# Patient Record
Sex: FEMALE | Race: WHITE | NOT HISPANIC OR LATINO | Employment: FULL TIME | URBAN - METROPOLITAN AREA
[De-identification: names, ages, dates, MRNs, and addresses within clinical notes are randomized per-mention and may not be internally consistent; named-entity substitution may affect disease eponyms.]

---

## 2020-01-01 ENCOUNTER — OFFICE VISIT (OUTPATIENT)
Dept: URGENT CARE | Facility: CLINIC | Age: 30
End: 2020-01-01
Payer: COMMERCIAL

## 2020-01-01 VITALS
OXYGEN SATURATION: 100 % | HEIGHT: 64 IN | TEMPERATURE: 97 F | BODY MASS INDEX: 25.78 KG/M2 | SYSTOLIC BLOOD PRESSURE: 115 MMHG | WEIGHT: 151 LBS | HEART RATE: 71 BPM | RESPIRATION RATE: 18 BRPM | DIASTOLIC BLOOD PRESSURE: 78 MMHG

## 2020-01-01 DIAGNOSIS — S62.91XA HAND FRACTURE, RIGHT, CLOSED, INITIAL ENCOUNTER: Primary | ICD-10-CM

## 2020-01-01 PROCEDURE — 99203 OFFICE O/P NEW LOW 30 MIN: CPT | Mod: S$GLB,,, | Performed by: EMERGENCY MEDICINE

## 2020-01-01 PROCEDURE — 73130 X-RAY EXAM OF HAND: CPT | Mod: FY,RT,S$GLB, | Performed by: RADIOLOGY

## 2020-01-01 PROCEDURE — 73130 XR HAND COMPLETE 3 VIEW RIGHT: ICD-10-PCS | Mod: FY,RT,S$GLB, | Performed by: RADIOLOGY

## 2020-01-01 PROCEDURE — 99203 PR OFFICE/OUTPT VISIT, NEW, LEVL III, 30-44 MIN: ICD-10-PCS | Mod: S$GLB,,, | Performed by: EMERGENCY MEDICINE

## 2020-01-01 RX ORDER — IBUPROFEN 200 MG
400 TABLET ORAL EVERY 6 HOURS PRN
COMMUNITY
End: 2022-05-18

## 2020-01-01 NOTE — PROGRESS NOTES
"Subjective:       Patient ID: Sherrie Bravo is a 29 y.o. female.    Vitals:  height is 5' 4" (1.626 m) and weight is 68.5 kg (151 lb). Her tympanic temperature is 97.3 °F (36.3 °C). Her blood pressure is 115/78 and her pulse is 71. Her respiration is 18 and oxygen saturation is 100%.     Chief Complaint: Hand Injury    Patient present with right hand injury patient states she ran into a door 2-3 d ago, right handed, is not pregnant, hx of right hand fx. Patient has tried taking Tylenol.     Hand Injury    Her dominant hand is their right hand. The incident occurred 2 days ago. The incident occurred at home. The injury mechanism was a direct blow. The pain is present in the right hand. The quality of the pain is described as aching and cramping. The pain is at a severity of 6/10. Associated symptoms include tingling. Pertinent negatives include no chest pain. She has tried acetaminophen, ice and elevation for the symptoms.       Constitution: Negative for chills, fatigue and fever.   HENT: Negative for congestion and sore throat.    Neck: Negative for painful lymph nodes.   Cardiovascular: Negative for chest pain and leg swelling.   Eyes: Negative for double vision and blurred vision.   Respiratory: Negative for cough and shortness of breath.    Gastrointestinal: Negative for nausea, vomiting and diarrhea.   Genitourinary: Negative for dysuria, frequency, urgency and history of kidney stones.   Musculoskeletal: Positive for pain, joint pain and joint swelling. Negative for muscle cramps and muscle ache.   Skin: Positive for color change and bruising. Negative for pale and rash.   Allergic/Immunologic: Negative for seasonal allergies.   Neurological: Negative for dizziness, history of vertigo, light-headedness, passing out and headaches.   Hematologic/Lymphatic: Negative for swollen lymph nodes.   Psychiatric/Behavioral: Negative for nervous/anxious, sleep disturbance and depression. The patient is not " nervous/anxious.        Objective:      Physical Exam   Constitutional: She is oriented to person, place, and time. She appears well-developed and well-nourished.   HENT:   Head: Normocephalic and atraumatic.   Neck: Normal range of motion. Neck supple.   Cardiovascular: Normal rate, regular rhythm, normal heart sounds and intact distal pulses.   Pulmonary/Chest: Breath sounds normal.   Musculoskeletal:   Right hand ecchymosis and tenderness 4-5 MC area, decrease finger ROM due to discomfort, remainder right hand/fingers/wrist/forearm non tender, FROM   Neurological: She is alert and oriented to person, place, and time.   Skin: Skin is warm and dry. Capillary refill takes less than 2 seconds.   Psychiatric: She has a normal mood and affect. Her behavior is normal.         Assessment:       1. Hand fracture, right, closed, initial encounter        Plan:         Hand fracture, right, closed, initial encounter  -     X-Ray Hand 3 view Right; Future; Expected date: 01/01/2020         Jermain Cazares MD  Go to the Emergency Department for any problems  Call your PCP for follow up next available.  Right fifth MC/finger long metal splint applied by JARON NICHOLSON after application.

## 2020-01-01 NOTE — PATIENT INSTRUCTIONS
Jermain Cazares MD  Go to the Emergency Department for any problems  Call your PCP for follow up next available.    Closed Hand Fracture (Adult)  You have a fracture, or broken bone, in your hand. This may be a small crack or chip in the bone. Or it may be a major break with the broken parts pushed out of place. A closed fracture means that the broken bone has not gone through the skin. A hand fracture is treated with a splint or cast. It usually takes 4 to 6 weeks to heal. Severe injuries may require surgery.     Home care  · Keep your arm elevated to reduce pain and swelling. When sitting or lying down, elevate your arm above the level of your heart. You can do this by placing your arm on a pillow that rests on your chest or on a pillow at your side. This is most important during the first 48 hours after injury.  · Apply an ice pack over the injured area for no more than 15 to 20 minutes. Do this every 1 to 2 hours for the first 24 to 48 hours. Continue with ice packs as needed to ease pain and swelling. To make an ice pack, put ice cubes in a plastic bag that seals at the top. Wrap the bag in a clean, thin towel or cloth. Never put ice or an ice pack directly on the skin. You can place the ice pack inside the sling and directly over the cast or splint. As the ice melts, be careful that the cast or splint doesnt get wet.  · Keep the cast or splint completely dry at all times. Bathe with your cast or splint out of the water, protected with 2 large plastic bags. Place 1 bag outside the other. Tape each bag with duct tape at the top end. If a fiberglass cast or splint gets wet, dry it with a hair dryer on a cool setting.  · You may use over-the-counter pain medicine to control pain, unless another pain medicine was prescribed. If you have chronic liver or kidney disease or ever had a stomach ulcer or GI bleeding, talk with your provider beforeusing these medicines.  Follow-up care  Follow up with your healthcare  provider within 1 week, or as advised. This is to be sure the bone is healing properly. If you were given a splint, it may be changed to a cast at your follow-up visit.  If X-rays were taken, you will be told of any new findings that may affect your care.  When to seek medical advice  Call your healthcare provider right away if any of these occur:  · The plaster cast or splint becomes wet or soft  · The fiberglass cast or splint stays wet for more than 24 hours  · The cast has a bad smell  · The plaster cast or splint becomes loose  · There is increased tightness or pain under the cast or splint  · The fingers on your injured hand become swollen, cold, blue, numb, or tingly  Date Last Reviewed: 12/3/2015  © 0953-6604 [employer] [address] [place] [address], [place] [address]. All rights reserved. This information is not intended as a substitute for professional medical care. Always follow your healthcare professional's instructions.      Follow up with Ortho next available.

## 2020-04-21 DIAGNOSIS — Z01.84 ANTIBODY RESPONSE EXAMINATION: ICD-10-CM

## 2020-05-21 DIAGNOSIS — Z01.84 ANTIBODY RESPONSE EXAMINATION: ICD-10-CM

## 2020-06-20 DIAGNOSIS — Z01.84 ANTIBODY RESPONSE EXAMINATION: ICD-10-CM

## 2020-07-14 ENCOUNTER — OFFICE VISIT (OUTPATIENT)
Dept: INTERNAL MEDICINE | Facility: CLINIC | Age: 30
End: 2020-07-14
Payer: COMMERCIAL

## 2020-07-14 ENCOUNTER — TELEPHONE (OUTPATIENT)
Dept: DERMATOLOGY | Facility: CLINIC | Age: 30
End: 2020-07-14

## 2020-07-14 ENCOUNTER — TELEPHONE (OUTPATIENT)
Dept: INTERNAL MEDICINE | Facility: CLINIC | Age: 30
End: 2020-07-14

## 2020-07-14 VITALS
DIASTOLIC BLOOD PRESSURE: 76 MMHG | HEART RATE: 88 BPM | SYSTOLIC BLOOD PRESSURE: 110 MMHG | WEIGHT: 140.63 LBS | OXYGEN SATURATION: 97 % | BODY MASS INDEX: 24.92 KG/M2 | HEIGHT: 63 IN

## 2020-07-14 DIAGNOSIS — Z91.09 ALLERGY TO ANIMALS: ICD-10-CM

## 2020-07-14 DIAGNOSIS — Z00.00 ANNUAL PHYSICAL EXAM: Primary | ICD-10-CM

## 2020-07-14 DIAGNOSIS — Z80.8 FAMILY HISTORY OF MELANOMA: ICD-10-CM

## 2020-07-14 PROCEDURE — 3008F BODY MASS INDEX DOCD: CPT | Mod: CPTII,S$GLB,, | Performed by: INTERNAL MEDICINE

## 2020-07-14 PROCEDURE — 99204 PR OFFICE/OUTPT VISIT, NEW, LEVL IV, 45-59 MIN: ICD-10-PCS | Mod: S$GLB,,, | Performed by: INTERNAL MEDICINE

## 2020-07-14 PROCEDURE — 99999 PR PBB SHADOW E&M-EST. PATIENT-LVL IV: ICD-10-PCS | Mod: PBBFAC,,, | Performed by: INTERNAL MEDICINE

## 2020-07-14 PROCEDURE — 99204 OFFICE O/P NEW MOD 45 MIN: CPT | Mod: S$GLB,,, | Performed by: INTERNAL MEDICINE

## 2020-07-14 PROCEDURE — 99999 PR PBB SHADOW E&M-EST. PATIENT-LVL IV: CPT | Mod: PBBFAC,,, | Performed by: INTERNAL MEDICINE

## 2020-07-14 PROCEDURE — 3008F PR BODY MASS INDEX (BMI) DOCUMENTED: ICD-10-PCS | Mod: CPTII,S$GLB,, | Performed by: INTERNAL MEDICINE

## 2020-07-14 RX ORDER — CETIRIZINE HYDROCHLORIDE 5 MG/1
5 TABLET ORAL DAILY PRN
Refills: 0 | COMMUNITY
Start: 2020-07-14 | End: 2021-01-15

## 2020-07-14 NOTE — TELEPHONE ENCOUNTER
----- Message from Madi Jansen sent at 7/14/2020 12:31 PM CDT -----  Regarding: appointment access  Patient called in to speak with someone at the office regarding scheduling an appointment. She would like a call back from the office and can be reached at    [phone]

## 2020-07-14 NOTE — PROGRESS NOTES
INTERNAL MEDICINE CLINIC    Progress Note    PRESENTING HISTORY     Previous PCP: Primary Doctor None  Chief Complaint/Reason for Visit:   No chief complaint on file.    History of Present Illness & ROS : Ms. Sherrie Chiang is a 30 y.o. female.      Last physical in [place] 6-7 years ago.    Last year she switched her shampoo.  She has sores all over the her heads.    Review of Systems:  Answers for HPI/ROS submitted by the patient on 7/13/2020   Rash  Chronicity: chronic  Onset: more than 1 month ago  Progression since onset: gradually worsening  Affected locations: scalp, neck, torso, back, right shoulder  Characteristics: redness, bruising, itchiness, peeling, scaling  Exposed to: nothing  anorexia: No  congestion: No  cough: No  diarrhea: No  eye pain: No  facial edema: No  fatigue: No  fever: No  joint pain: No  nail changes: No  rhinorrhea: No  shortness of breath: No  sore throat: No  vomiting: No  Treatments tried: nothing  Improvement on treatment: no relief  asthma: Yes  allergies: Yes  eczema: No  varicella: Yes      PAST HISTORY:     Past Medical History:   Diagnosis Date    Allergy to animals 7/14/2020    Family history of melanoma 7/14/2020    Malaria 2010    Was only on prophylatic malarial drug.       History reviewed. No pertinent surgical history.    Family History   Problem Relation Age of Onset    Melanoma Mother 25        X 4    Diabetes Maternal Grandmother        Social History     Socioeconomic History    Marital status: Single     Spouse name: Not on file    Number of children: Not on file    Years of education: Not on file    Highest education level: Not on file   Occupational History    Not on file   Social Needs    Financial resource strain: Not very hard    Food insecurity     Worry: Never true     Inability: Never true    Transportation needs     Medical: No     Non-medical: No   Tobacco Use    Smoking status: Current Some Day Smoker     Packs/day: 0.10     Years:  1.00     Pack years: 0.10    Smokeless tobacco: Never Used   Substance and Sexual Activity    Alcohol use: Not Currently    Drug use: Never    Sexual activity: Not Currently   Lifestyle    Physical activity     Days per week: 3 days     Minutes per session: 20 min    Stress: Very much   Relationships    Social connections     Talks on phone: More than three times a week     Gets together: More than three times a week     Attends Holiness service: Not on file     Active member of club or organization: No     Attends meetings of clubs or organizations: Never     Relationship status: Never    Other Topics Concern    Not on file   Social History Narrative    Works at [employer]     Single    No kids       MEDICATIONS & ALLERGIES:     No current outpatient medications on file prior to visit.     No current facility-administered medications on file prior to visit.         Review of patient's allergies indicates:   Allergen Reactions    Ricola [menthol] Hives     Ricola cough drops       Medications Reconciliation:   I have reconciled the patient's home medications with the patient/family. I have updated all changes.  Refer to After-Visit Medication List.    OBJECTIVE:     Vital Signs:  Vitals:    07/14/20 0812   BP: 110/76   Pulse: 88     Wt Readings from Last 1 Encounters:   07/14/20 0812 63.8 kg (140 lb 10.5 oz)     Body mass index is 24.92 kg/m².     Physical Exam:  General: Well developed, well nourished. No distress.  HEENT: Head is normocephalic, atraumatic  Eyes: Clear conjunctiva.  Neck: Supple, symmetrical neck; trachea midline.  Lungs: Clear to auscultation bilaterally and normal respiratory effort.  Cardiovascular: Heart with regular rate and rhythm.    Extremities: No LE edema. Pulses 2+ and symmetric.   Abdomen: Abdomen is soft, non-tender non-distended with normal bowel sounds.  Skin: Nevi noted on back.  There are some red papules on scalp.  Musculoskeletal: Normal gait.    Neurologic: Normal strength and tone. No focal numbness or weakness.   Psychiatric: Normal affect. Alert.      ASSESSMENT & PLAN:   New patient who has not seen Primary Care Provider at [facility] for the past 3 years.  Patient is new to me. Medical, surgical, social, medication and allergy histories were obtained during this visit.    Annual physical exam  -     CBC auto differential; Future; Expected date: 07/14/2020  -     Comprehensive metabolic panel; Future; Expected date: 07/14/2020  -     Lipid Panel; Future; Expected date: 07/14/2020  -     HIV 1/2 Ag/Ab (4th Gen); Future; Expected date: 07/14/2020  -     Hemoglobin A1C; Future; Expected date: 07/14/2020  -     TSH; Future; Expected date: 07/14/2020  -     Vitamin D; Future; Expected date: 01/10/2021  -     Cancel: Ambulatory referral/consult to Allergy; Future; Expected date: 07/21/2020  -     RPR; Future; Expected date: 07/14/2020    Allergy to animals  -     Ambulatory referral/consult to Allergy; Future; Expected date: 07/21/2020    Family history of melanoma  -     Ambulatory referral/consult to Dermatology; Future; Expected date: 07/21/2020      Scheduled Follow-up :  Future Appointments   Date Time Provider Department Center   1/15/2021  9:30 AM Cuca Nash MD [facility]       After Visit Medication List :     Medication List          Accurate as of July 14, 2020  8:45 AM. If you have any questions, ask your nurse or doctor.            START taking these medications    cetirizine 5 MG tablet  Commonly known as: ZYRTEC  Take 1 tablet (5 mg total) by mouth daily as needed for Allergies.  Started by: Laci Zhong MD           Where to Get Your Medications      You can get these medications from any pharmacy    You don't need a prescription for these medications  · cetirizine 5 MG tablet         Signing Physician:  Laci Zhong MD

## 2020-07-14 NOTE — TELEPHONE ENCOUNTER
----- Message from Nancy Caban sent at 7/14/2020  2:19 PM CDT -----  Hello,    This patient left the building before calling her. I am going to cancel her labs. Please put her order in to be rescheduled.    Thank you,  [facility]  [phone]

## 2020-07-20 DIAGNOSIS — Z01.84 ANTIBODY RESPONSE EXAMINATION: ICD-10-CM

## 2020-08-04 ENCOUNTER — OFFICE VISIT (OUTPATIENT)
Dept: ALLERGY | Facility: CLINIC | Age: 30
End: 2020-08-04
Payer: COMMERCIAL

## 2020-08-04 VITALS — HEIGHT: 63 IN | WEIGHT: 142.19 LBS | BODY MASS INDEX: 25.2 KG/M2 | RESPIRATION RATE: 16 BRPM

## 2020-08-04 DIAGNOSIS — J45.20 MILD INTERMITTENT ASTHMA WITHOUT COMPLICATION: ICD-10-CM

## 2020-08-04 DIAGNOSIS — J31.0 CHRONIC RHINITIS: ICD-10-CM

## 2020-08-04 DIAGNOSIS — Z91.018 FOOD ALLERGY: Primary | ICD-10-CM

## 2020-08-04 PROCEDURE — 99244 PR OFFICE CONSULTATION,LEVEL IV: ICD-10-PCS | Mod: S$GLB,,, | Performed by: ALLERGY & IMMUNOLOGY

## 2020-08-04 PROCEDURE — 95004 PR ALLERGY SKIN TESTS,ALLERGENS: ICD-10-PCS | Mod: S$GLB,,, | Performed by: ALLERGY & IMMUNOLOGY

## 2020-08-04 PROCEDURE — 99244 OFF/OP CNSLTJ NEW/EST MOD 40: CPT | Mod: S$GLB,,, | Performed by: ALLERGY & IMMUNOLOGY

## 2020-08-04 PROCEDURE — 99999 PR PBB SHADOW E&M-EST. PATIENT-LVL III: ICD-10-PCS | Mod: PBBFAC,,, | Performed by: ALLERGY & IMMUNOLOGY

## 2020-08-04 PROCEDURE — 99999 PR PBB SHADOW E&M-EST. PATIENT-LVL III: CPT | Mod: PBBFAC,,, | Performed by: ALLERGY & IMMUNOLOGY

## 2020-08-04 PROCEDURE — 95004 PERQ TESTS W/ALRGNC XTRCS: CPT | Mod: S$GLB,,, | Performed by: ALLERGY & IMMUNOLOGY

## 2020-08-04 NOTE — PROGRESS NOTES
Subjective:       Patient ID: Sherrie Chiang is a 30 y.o. female.    Chief Complaint:  Allergies and Allergic Reaction (Ricola cough drops, pineapple)      31 yo woman presents for consult from Dr Laci Zhong for possible allergies. She states she was living with er sister who had 3 cats and 3 dogs and she started to have frequent ear infections. Has congestion, runny nose with it. Was seen and told was from allergies and to take zyrtec daily. She has been doping for 3 years. Now on her own and no pets and she wonders if she still needs to take zyrtec. She has been off for past 5 days and may slight sneeze but nothing much and she is house sitting and there is a cat. She has occ sneeze. No runny nose, no congestion, no itchy watery eyes. She cabrera have H.o albuterol and has SOB about twice per month and uses albuterol with relief. No oral steroids in last year. No ER or UC visits. No H/o eczema. She had reaction to pineapple Y ears ago, her tongue and throat itch and tongue was swollen. She also had full body hives with Ricola cough drops. She is vegetarian so not eaten meat in 25 years but does not think allergic to it. She recently had rash on scalp and feels was reaction to a shampoo but seeing derm next week for it. She has no other medical issues. Did have ear tubes when little, has had recurrent ear infections all her life.       Environmental History: see history section for home environment  Review of Systems   Constitutional: Negative for appetite change, chills, fatigue and fever.   HENT: Positive for sneezing. Negative for congestion, ear discharge, ear pain, facial swelling, nosebleeds, postnasal drip, rhinorrhea, sinus pressure, sore throat, trouble swallowing and voice change.    Eyes: Negative for discharge, redness, itching and visual disturbance.   Respiratory: Positive for chest tightness and shortness of breath. Negative for cough, choking and wheezing.    Cardiovascular: Negative for chest  pain, palpitations and leg swelling.   Gastrointestinal: Negative for abdominal distention, abdominal pain, constipation, diarrhea, nausea and vomiting.   Genitourinary: Negative for difficulty urinating.   Musculoskeletal: Negative for arthralgias, gait problem, joint swelling and myalgias.   Skin: Positive for rash. Negative for color change.   Neurological: Negative for dizziness, syncope, weakness, light-headedness and headaches.   Hematological: Negative for adenopathy. Does not bruise/bleed easily.   Psychiatric/Behavioral: Negative for agitation, behavioral problems, confusion and sleep disturbance. The patient is not nervous/anxious.         Objective:      Physical Exam  Vitals signs and nursing note reviewed.   Constitutional:       General: She is not in acute distress.     Appearance: She is well-developed.   HENT:      Head: Normocephalic and atraumatic.      Right Ear: Hearing, tympanic membrane, ear canal and external ear normal.      Left Ear: Hearing, tympanic membrane, ear canal and external ear normal.      Nose: No septal deviation, mucosal edema or rhinorrhea.      Right Sinus: No maxillary sinus tenderness or frontal sinus tenderness.      Left Sinus: No maxillary sinus tenderness or frontal sinus tenderness.      Mouth/Throat:      Pharynx: Uvula midline. No uvula swelling.   Eyes:      General:         Right eye: No discharge.         Left eye: No discharge.      Conjunctiva/sclera: Conjunctivae normal.   Neck:      Musculoskeletal: Normal range of motion.      Thyroid: No thyromegaly.   Cardiovascular:      Rate and Rhythm: Normal rate and regular rhythm.      Heart sounds: Normal heart sounds. No murmur.   Pulmonary:      Effort: Pulmonary effort is normal. No respiratory distress.      Breath sounds: Normal breath sounds. No wheezing.   Abdominal:      General: There is no distension.      Palpations: Abdomen is soft.      Tenderness: There is no abdominal tenderness.   Musculoskeletal:  Normal range of motion.         General: No tenderness.   Lymphadenopathy:      Cervical: No cervical adenopathy.   Skin:     General: Skin is warm and dry.      Findings: No erythema or rash.   Neurological:      Mental Status: She is alert and oriented to person, place, and time.   Psychiatric:         Behavior: Behavior normal.         Thought Content: Thought content normal.         Judgment: Judgment normal.         Laboratory:   Percutaneous Skin Testing: prick skin test all inhalants and pineapple, #61, 8/4/2020: 1-2+ dp dust mite and english plantain weed, 3+ histamine control and remainder tested negative, see flow sheet  Assessment:       1. Food allergy    2. Chronic rhinitis    3. Mild intermittent asthma without complication         Plan:       1. Advised weed pollen is out in fall and can use H1 blocker daily a needed  2. Negative allergy test to pineapple, likely more irritant symptoms from it so can introduce again  3. Dr Zhong notified of completed consult via [employer]

## 2020-08-04 NOTE — LETTER
August 4, 2020      Laci Zhong MD  [address]  [facility] [address]           [place] - Allergy  [address].  [place] [address]  Phone: [phone]          Patient: Sherrie Chiang   MR Number: [id number]   YOB: 1990   Date of Visit: 8/4/2020       Dear Dr. Laci Zhong:    Thank you for referring Sherrie Chiang to me for evaluation. Attached you will find relevant portions of my assessment and plan of care.    If you have questions, please do not hesitate to call me. I look forward to following Sherrie Chiang along with you.    Sincerely,    Dilcia Gonzáles MD    Enclosure  CC:  No Recipients    If you would like to receive this communication electronically, please contact [email or web address] or [phone] to request more information on [employer] Link access.    For providers and/or their staff who would like to refer a patient to [facility], please contact us through our one-stop-shop provider referral line, [facility], at [phone].    If you feel you have received this communication in error or would no longer like to receive these types of communications, please e-mail [email or web address]

## 2020-08-10 ENCOUNTER — OFFICE VISIT (OUTPATIENT)
Dept: DERMATOLOGY | Facility: CLINIC | Age: 30
End: 2020-08-10
Payer: COMMERCIAL

## 2020-08-10 VITALS — BODY MASS INDEX: 25.15 KG/M2 | WEIGHT: 142 LBS

## 2020-08-10 DIAGNOSIS — B36.0 TINEA VERSICOLOR: ICD-10-CM

## 2020-08-10 DIAGNOSIS — Z80.8 FAMILY HISTORY OF MELANOMA: ICD-10-CM

## 2020-08-10 DIAGNOSIS — D22.9 NEVUS OF MULTIPLE SITES: ICD-10-CM

## 2020-08-10 DIAGNOSIS — L21.9 SEBORRHEIC DERMATITIS: Primary | ICD-10-CM

## 2020-08-10 DIAGNOSIS — L82.1 SEBORRHEIC KERATOSES: ICD-10-CM

## 2020-08-10 DIAGNOSIS — Z12.83 SKIN EXAM, SCREENING FOR CANCER: ICD-10-CM

## 2020-08-10 PROCEDURE — 99203 PR OFFICE/OUTPT VISIT, NEW, LEVL III, 30-44 MIN: ICD-10-PCS | Mod: 25,S$GLB,, | Performed by: DERMATOLOGY

## 2020-08-10 PROCEDURE — 99999 PR PBB SHADOW E&M-EST. PATIENT-LVL III: ICD-10-PCS | Mod: PBBFAC,,, | Performed by: DERMATOLOGY

## 2020-08-10 PROCEDURE — 87220 PR  TISSUE EXAM BY KOH: ICD-10-PCS | Mod: S$GLB,,, | Performed by: DERMATOLOGY

## 2020-08-10 PROCEDURE — 87220 TISSUE EXAM FOR FUNGI: CPT | Mod: S$GLB,,, | Performed by: DERMATOLOGY

## 2020-08-10 PROCEDURE — 3008F PR BODY MASS INDEX (BMI) DOCUMENTED: ICD-10-PCS | Mod: CPTII,S$GLB,, | Performed by: DERMATOLOGY

## 2020-08-10 PROCEDURE — 99203 OFFICE O/P NEW LOW 30 MIN: CPT | Mod: 25,S$GLB,, | Performed by: DERMATOLOGY

## 2020-08-10 PROCEDURE — 99999 PR PBB SHADOW E&M-EST. PATIENT-LVL III: CPT | Mod: PBBFAC,,, | Performed by: DERMATOLOGY

## 2020-08-10 PROCEDURE — 3008F BODY MASS INDEX DOCD: CPT | Mod: CPTII,S$GLB,, | Performed by: DERMATOLOGY

## 2020-08-10 RX ORDER — FLUOCINONIDE TOPICAL SOLUTION USP, 0.05% 0.5 MG/ML
SOLUTION TOPICAL
Qty: 60 ML | Refills: 6 | Status: SHIPPED | OUTPATIENT
Start: 2020-08-10 | End: 2021-09-24

## 2020-08-10 NOTE — PROGRESS NOTES
Subjective:       Patient ID:  Sherrie Bravo is a 30 y.o. female who presents for   Chief Complaint   Patient presents with    Skin Check     upper and lower exam     Lesion     scalp     Rash on scalp off and on pruritic has been about a year.  Also rash on chest and back for several week (2 spots) and would like skin check (her mother has had 2 melanomas on face, first one was not pigmented)  2 new lesions on right nipple not painful.     Lesion - Initial  Affected locations: scalp, face, left arm, right arm, chest, torso, back, abdomen, left upper leg, right upper leg, right lower leg and left lower leg  Signs / symptoms: itching and irritated  Aggravated by: nothing  Relieving factors/Treatments tried: nothing        Review of Systems   Constitutional: Negative for fever, chills, weight loss, weight gain, fatigue, night sweats and malaise.   Skin: Positive for itching and rash. Negative for daily sunscreen use, activity-related sunscreen use and wears hat.   Hematologic/Lymphatic: Bruises/bleeds easily.        Objective:    Physical Exam   Constitutional: She appears well-developed and well-nourished. No distress.   Neurological: She is alert and oriented to person, place, and time. She is not disoriented.   Psychiatric: She has a normal mood and affect.   Skin:   Areas Examined (abnormalities noted in diagram):   Scalp / Hair Palpated and Inspected  Head / Face Inspection Performed  Neck Inspection Performed  Chest / Axilla Inspection Performed  Abdomen Inspection Performed  Genitals / Buttocks / Groin Inspection Performed  Back Inspection Performed  RUE Inspected  LUE Inspection Performed  RLE Inspected  LLE Inspection Performed  Nails and Digits Inspection Performed                       Diagram Legend     Erythematous scaling macule/papule c/w actinic keratosis       Vascular papule c/w angioma      Pigmented verrucoid papule/plaque c/w seborrheic keratosis      Yellow umbilicated papule c/w  "sebaceous hyperplasia      Irregularly shaped tan macule c/w lentigo     1-2 mm smooth white papules consistent with Milia      Movable subcutaneous cyst with punctum c/w epidermal inclusion cyst      Subcutaneous movable cyst c/w pilar cyst      Firm pink to brown papule c/w dermatofibroma      Pedunculated fleshy papule(s) c/w skin tag(s)      Evenly pigmented macule c/w junctional nevus     Mildly variegated pigmented, slightly irregular-bordered macule c/w mildly atypical nevus      Flesh colored to evenly pigmented papule c/w intradermal nevus       Pink pearly papule/plaque c/w basal cell carcinoma      Erythematous hyperkeratotic cursted plaque c/w SCC      Surgical scar with no sign of skin cancer recurrence      Open and closed comedones      Inflammatory papules and pustules      Verrucoid papule consistent consistent with wart     Erythematous eczematous patches and plaques     Dystrophic onycholytic nail with subungual debris c/w onychomycosis     Umbilicated papule    Erythematous-base heme-crusted tan verrucoid plaque consistent with inflamed seborrheic keratosis     Erythematous Silvery Scaling Plaque c/w Psoriasis     See annotation      Assessment / Plan:        Seborrheic dermatitis  -     fluocinonide (LIDEX) 0.05 % external solution; Use as bedtime for scalp  Dispense: 60 mL; Refill: 6  DHS shampoo with zinc    Family history of melanoma  -     Ambulatory referral/consult to Dermatology  See hpi    Tinea versicolor  lamisil cream bid  nizoral shampoo  Brochure provided      Nevus of multiple sites  The "ABCD" rules to observe pigmented lesions were reviewed.  Brochure provided      Seborrheic keratoses  reassurance  Brochure provided      Skin exam, screening for cancer  Total body skin examination performed today including at least 12 points as noted in physical examination. No lesions suspicious for malignancy noted.               Follow up in about 1 year (around 8/10/2021).  "

## 2020-08-10 NOTE — LETTER
August 10, 2020      Laci Zhong MD  [address]  [facility] [address]           [place] - Dermatology  [address].  [place] [address]  Phone: [phone]  Fax: [phone]          Patient: Sherrie Bravo   MR Number: [id number]   YOB: 1990   Date of Visit: 8/10/2020       Dear Dr. Laci Zhong:    Thank you for referring Sherrie Bravo to me for evaluation. Attached you will find relevant portions of my assessment and plan of care.    If you have questions, please do not hesitate to call me. I look forward to following Sherrie Bravo along with you.    Sincerely,    Carmelita Mahoney MD    Enclosure  CC:  No Recipients    If you would like to receive this communication electronically, please contact [email or web address] or [phone] to request more information on [employer] Link access.    For providers and/or their staff who would like to refer a patient to [facility], please contact us through our one-stop-shop provider referral line, [facility] [place], at [phone].    If you feel you have received this communication in error or would no longer like to receive these types of communications, please e-mail [email or web address]

## 2020-08-19 DIAGNOSIS — Z01.84 ANTIBODY RESPONSE EXAMINATION: ICD-10-CM

## 2020-09-18 DIAGNOSIS — Z01.84 ANTIBODY RESPONSE EXAMINATION: ICD-10-CM

## 2020-10-09 ENCOUNTER — LAB VISIT (OUTPATIENT)
Dept: LAB | Facility: OTHER | Age: 30
End: 2020-10-09
Attending: OBSTETRICS & GYNECOLOGY
Payer: COMMERCIAL

## 2020-10-09 ENCOUNTER — OFFICE VISIT (OUTPATIENT)
Dept: OBSTETRICS AND GYNECOLOGY | Facility: CLINIC | Age: 30
End: 2020-10-09
Payer: COMMERCIAL

## 2020-10-09 VITALS
BODY MASS INDEX: 22.73 KG/M2 | DIASTOLIC BLOOD PRESSURE: 82 MMHG | HEIGHT: 63 IN | WEIGHT: 128.31 LBS | SYSTOLIC BLOOD PRESSURE: 106 MMHG

## 2020-10-09 DIAGNOSIS — Z01.419 ENCOUNTER FOR GYNECOLOGICAL EXAMINATION: Primary | ICD-10-CM

## 2020-10-09 DIAGNOSIS — L08.9 STAPH SKIN INFECTION: ICD-10-CM

## 2020-10-09 DIAGNOSIS — Z11.3 SCREEN FOR STD (SEXUALLY TRANSMITTED DISEASE): ICD-10-CM

## 2020-10-09 DIAGNOSIS — B95.8 STAPH SKIN INFECTION: ICD-10-CM

## 2020-10-09 LAB
CANDIDA RRNA VAG QL PROBE: NEGATIVE
G VAGINALIS RRNA GENITAL QL PROBE: POSITIVE
T VAGINALIS RRNA GENITAL QL PROBE: NEGATIVE

## 2020-10-09 PROCEDURE — 87480 CANDIDA DNA DIR PROBE: CPT

## 2020-10-09 PROCEDURE — 99999 PR PBB SHADOW E&M-EST. PATIENT-LVL III: ICD-10-PCS | Mod: PBBFAC,,, | Performed by: OBSTETRICS & GYNECOLOGY

## 2020-10-09 PROCEDURE — 86592 SYPHILIS TEST NON-TREP QUAL: CPT

## 2020-10-09 PROCEDURE — 99385 PR PREVENTIVE VISIT,NEW,18-39: ICD-10-PCS | Mod: 25,S$GLB,, | Performed by: OBSTETRICS & GYNECOLOGY

## 2020-10-09 PROCEDURE — 99385 PREV VISIT NEW AGE 18-39: CPT | Mod: 25,S$GLB,, | Performed by: OBSTETRICS & GYNECOLOGY

## 2020-10-09 PROCEDURE — 87510 GARDNER VAG DNA DIR PROBE: CPT

## 2020-10-09 PROCEDURE — 36415 COLL VENOUS BLD VENIPUNCTURE: CPT

## 2020-10-09 PROCEDURE — 87491 CHLMYD TRACH DNA AMP PROBE: CPT

## 2020-10-09 PROCEDURE — 87624 HPV HI-RISK TYP POOLED RSLT: CPT

## 2020-10-09 PROCEDURE — 80074 ACUTE HEPATITIS PANEL: CPT

## 2020-10-09 PROCEDURE — 99999 PR PBB SHADOW E&M-EST. PATIENT-LVL III: CPT | Mod: PBBFAC,,, | Performed by: OBSTETRICS & GYNECOLOGY

## 2020-10-09 PROCEDURE — 88175 CYTOPATH C/V AUTO FLUID REDO: CPT

## 2020-10-09 PROCEDURE — 86703 HIV-1/HIV-2 1 RESULT ANTBDY: CPT

## 2020-10-09 RX ORDER — SULFAMETHOXAZOLE AND TRIMETHOPRIM 800; 160 MG/1; MG/1
1 TABLET ORAL 2 TIMES DAILY
Qty: 20 TABLET | Refills: 0 | Status: SHIPPED | OUTPATIENT
Start: 2020-10-09 | End: 2020-10-19

## 2020-10-09 NOTE — PROGRESS NOTES
"  SUBJECTIVE:     Chief Complaint: Well Woman       History of Present Illness:  Annual Exam  Patient presents for annual exam.   She c/o "bug bite on butt" today.  LMP: 9/19/2020  She denies any vd, vb, dyspareunia, dysuria, depression, anxiety.  Last pap was in unsure and was neg.  Birth Control: Paraguard placed 2013.  Wants all std testing.   Did not have gardasil, thinking about this.    GYN screening history: denies  Mammogram history: na  Colonoscopy history: na  Dexa history: na    FH:   Breast cancer: none  Colon cancer: paternal GF  Ovarian cancer: none    Review of patient's allergies indicates:  No Known Allergies    Past Medical History:   Diagnosis Date    Allergy     Allergy to animals 7/14/2020    Asthma     Family history of melanoma 7/14/2020    Malaria 2010    Was only on prophylatic malarial drug.     Past Surgical History:   Procedure Laterality Date    TYMPANOSTOMY TUBE PLACEMENT       OB History    No obstetric history on file.       Family History   Problem Relation Age of Onset    Melanoma Mother 25        X 4    Asthma Mother     Diabetes Maternal Grandmother     Allergic rhinitis Neg Hx     Allergies Neg Hx     Angioedema Neg Hx     Atopy Neg Hx     Eczema Neg Hx     Immunodeficiency Neg Hx     Rhinitis Neg Hx     Urticaria Neg Hx      Social History     Tobacco Use    Smoking status: Former Smoker    Smokeless tobacco: Never Used   Substance Use Topics    Alcohol use: Not Currently    Drug use: Never       Current Outpatient Medications   Medication Sig    fluocinonide (LIDEX) 0.05 % external solution Use as bedtime for scalp    MULTIVITAMIN ORAL Take by mouth.    cetirizine (ZYRTEC) 5 MG tablet Take 1 tablet (5 mg total) by mouth daily as needed for Allergies. (Patient not taking: Reported on 8/10/2020)    sulfamethoxazole-trimethoprim 800-160mg (BACTRIM DS) 800-160 mg Tab Take 1 tablet by mouth 2 (two) times daily. for 10 days     No current " facility-administered medications for this visit.        Review of Systems:  GENERAL: No fever, chills, fatigability or weight loss.  CARDIOVASCULAR: No chest pain. No palpitations.  RESPIRATORY: No SOB, no wheezing.  BREAST: Denies pain. No lumps. No discharge.  VULVAR: No pain, no lesions and no itching.  VAGINAL: No relaxation, no itching, no discharge, no abnormal bleeding and no lesions.  ABDOMEN: No abdominal pain. Denies nausea. Denies vomiting. No diarrhea. No constipation  URINARY: No incontinence, no nocturia, no frequency and no dysuria.  NEUROLOGICAL: No headaches. No vision changes.       OBJECTIVE:     Vitals:    10/09/20 1548   BP: 106/82       Physical Exam:  Gen: NAD, well developed, well-nourished  HEENT: Normocephalic, atraumatic  Eyes: EOM nl, conjuntivae normal  Neck: ROM normal, no thyromegaly  Respiratory: Effort normal   Abd: soft, nontender, no masses palpated  Breast: Normal bilaterally, no masses, lesions or tenderness. No nipple discharge on expression, no lymphadenopathy bilaterally.  SSE:  Vulva: no lesions or rashes  Cervix: No lesions noted, nonfriable, no vaginal discharge or vaginal bleeding noted  BME:   Cervix: No CMT  Adnexa: nl bilaterally, no masses or fullness palpated  Uterus: normal, nonenlarged  3cm superficial staph abscess noted on left buttock  Musculoskeletal: normal ROM  Neuro: alert, AAOx3  Skin: warm and dry  Psych: mood/affect nl, behavior normal, judgement normal, thought content normal        ASSESSMENT:       ICD-10-CM ICD-9-CM    1. Encounter for gynecological examination  Z01.419 V72.31 Liquid-Based Pap Smear, Screening      HPV High Risk Genotypes, PCR   2. Screen for STD (sexually transmitted disease)  Z11.3 V74.5 C. trachomatis/N. gonorrhoeae by AMP DNA      RPR      Vaginosis Screen by DNA Probe      HIV 1/2 Ag/Ab (4th Gen)      Hepatitis Panel, Acute   3. Staph skin infection  L08.9 686.9     B95.8 041.10           Plan:      Sherrie was seen today for  well woman.    Diagnoses and all orders for this visit:    Encounter for gynecological examination  -     Liquid-Based Pap Smear, Screening  -     HPV High Risk Genotypes, PCR    Screen for STD (sexually transmitted disease)  -     C. trachomatis/N. gonorrhoeae by AMP DNA  -     RPR; Future  -     Vaginosis Screen by DNA Probe  -     HIV 1/2 Ag/Ab (4th Gen); Future  -     Hepatitis Panel, Acute; Future    Staph skin infection    Other orders  -     sulfamethoxazole-trimethoprim 800-160mg (BACTRIM DS) 800-160 mg Tab; Take 1 tablet by mouth 2 (two) times daily. for 10 days        Orders Placed This Encounter   Procedures    C. trachomatis/N. gonorrhoeae by AMP DNA    Vaginosis Screen by DNA Probe    HPV High Risk Genotypes, PCR    RPR    HIV 1/2 Ag/Ab (4th Gen)    Hepatitis Panel, Acute       Follow up in one year for annual, or prn.    Julie R Jeansonne

## 2020-10-11 RX ORDER — METRONIDAZOLE 500 MG/1
500 TABLET ORAL EVERY 12 HOURS
Qty: 14 TABLET | Refills: 0 | Status: SHIPPED | OUTPATIENT
Start: 2020-10-11 | End: 2020-10-19

## 2020-10-12 LAB
HAV IGM SERPL QL IA: NEGATIVE
HBV CORE IGM SERPL QL IA: NEGATIVE
HBV SURFACE AG SERPL QL IA: NEGATIVE
HCV AB SERPL QL IA: NEGATIVE
HIV 1+2 AB+HIV1 P24 AG SERPL QL IA: NEGATIVE

## 2020-10-13 LAB — RPR SER QL: NORMAL

## 2020-10-15 ENCOUNTER — PATIENT MESSAGE (OUTPATIENT)
Dept: OBSTETRICS AND GYNECOLOGY | Facility: CLINIC | Age: 30
End: 2020-10-15

## 2020-10-15 ENCOUNTER — OFFICE VISIT (OUTPATIENT)
Dept: OBSTETRICS AND GYNECOLOGY | Facility: CLINIC | Age: 30
End: 2020-10-15
Payer: COMMERCIAL

## 2020-10-15 ENCOUNTER — PATIENT MESSAGE (OUTPATIENT)
Dept: DERMATOLOGY | Facility: CLINIC | Age: 30
End: 2020-10-15

## 2020-10-15 VITALS
HEIGHT: 63 IN | WEIGHT: 129.44 LBS | SYSTOLIC BLOOD PRESSURE: 116 MMHG | BODY MASS INDEX: 22.93 KG/M2 | DIASTOLIC BLOOD PRESSURE: 80 MMHG

## 2020-10-15 DIAGNOSIS — L02.31 LEFT BUTTOCK ABSCESS: Primary | ICD-10-CM

## 2020-10-15 PROCEDURE — 3008F BODY MASS INDEX DOCD: CPT | Mod: CPTII,S$GLB,, | Performed by: OBSTETRICS & GYNECOLOGY

## 2020-10-15 PROCEDURE — 10061 PR DRAIN SKIN ABSCESS COMPLIC: ICD-10-PCS | Mod: S$GLB,,, | Performed by: OBSTETRICS & GYNECOLOGY

## 2020-10-15 PROCEDURE — 87186 SC STD MICRODIL/AGAR DIL: CPT

## 2020-10-15 PROCEDURE — 99214 OFFICE O/P EST MOD 30 MIN: CPT | Mod: 25,S$GLB,, | Performed by: OBSTETRICS & GYNECOLOGY

## 2020-10-15 PROCEDURE — 99999 PR PBB SHADOW E&M-EST. PATIENT-LVL III: CPT | Mod: PBBFAC,,, | Performed by: OBSTETRICS & GYNECOLOGY

## 2020-10-15 PROCEDURE — 10061 I&D ABSCESS COMP/MULTIPLE: CPT | Mod: S$GLB,,, | Performed by: OBSTETRICS & GYNECOLOGY

## 2020-10-15 PROCEDURE — 3008F PR BODY MASS INDEX (BMI) DOCUMENTED: ICD-10-PCS | Mod: CPTII,S$GLB,, | Performed by: OBSTETRICS & GYNECOLOGY

## 2020-10-15 PROCEDURE — 99999 PR PBB SHADOW E&M-EST. PATIENT-LVL III: ICD-10-PCS | Mod: PBBFAC,,, | Performed by: OBSTETRICS & GYNECOLOGY

## 2020-10-15 PROCEDURE — 87077 CULTURE AEROBIC IDENTIFY: CPT

## 2020-10-15 PROCEDURE — 99214 PR OFFICE/OUTPT VISIT, EST, LEVL IV, 30-39 MIN: ICD-10-PCS | Mod: 25,S$GLB,, | Performed by: OBSTETRICS & GYNECOLOGY

## 2020-10-15 PROCEDURE — 87070 CULTURE OTHR SPECIMN AEROBIC: CPT

## 2020-10-15 RX ORDER — HYDROCODONE BITARTRATE AND ACETAMINOPHEN 5; 325 MG/1; MG/1
1 TABLET ORAL EVERY 6 HOURS PRN
Qty: 20 TABLET | Refills: 0 | Status: SHIPPED | OUTPATIENT
Start: 2020-10-15 | End: 2021-01-15

## 2020-10-15 NOTE — TELEPHONE ENCOUNTER
Spoke with pt and scheduled appt for today with Dr. Benson at 3:15p, pt verbalized understanding.

## 2020-10-15 NOTE — PROGRESS NOTES
History & Physical  Gynecology      SUBJECTIVE:     Chief Complaint: Procedure (possible I&D)       History of Present Illness:  Pt is a 29 y/o here for I&D of buttocks abscess.  Has been present for about 4 weeks in total.  The last 1.5 weeks has worsened.  Was seen by Dr. Jeansonne for annual exam and started on Flagyl and Bactrim.  Had difficulty tolerating Flagyl and had some vomiting.  Is now tolerating medications without issues, as she is staggering the doses.  Pt reports that, despite the abx, area has worsened and enlarged.   In addition, pt has developed similar lesion under right axilla.  Reports that this lesion has been draining.  Has been trying to express discharge from the area.  Has decreased in size but still present.  Notes subjective fever/chills but hasn't taken temperature.  Notes mild lower back pain that she is contributing to standing and sitting in weird positions to avoid pain.  Taking ibuprofen for pain control with minimal relief.        Review of patient's allergies indicates:  No Known Allergies    Past Medical History:   Diagnosis Date    Allergy     Allergy to animals 7/14/2020    Asthma     Family history of melanoma 7/14/2020    Malaria 2010    Was only on prophylatic malarial drug.     Past Surgical History:   Procedure Laterality Date    TYMPANOSTOMY TUBE PLACEMENT       OB History    No obstetric history on file.       Family History   Problem Relation Age of Onset    Melanoma Mother 25        X 4    Asthma Mother     Diabetes Maternal Grandmother     Allergic rhinitis Neg Hx     Allergies Neg Hx     Angioedema Neg Hx     Atopy Neg Hx     Eczema Neg Hx     Immunodeficiency Neg Hx     Rhinitis Neg Hx     Urticaria Neg Hx      Social History     Tobacco Use    Smoking status: Former Smoker    Smokeless tobacco: Never Used   Substance Use Topics    Alcohol use: Not Currently    Drug use: Never       Current Outpatient Medications   Medication Sig     fluocinonide (LIDEX) 0.05 % external solution Use as bedtime for scalp    metroNIDAZOLE (FLAGYL) 500 MG tablet Take 1 tablet (500 mg total) by mouth every 12 (twelve) hours. for 7 days    MULTIVITAMIN ORAL Take by mouth.    sulfamethoxazole-trimethoprim 800-160mg (BACTRIM DS) 800-160 mg Tab Take 1 tablet by mouth 2 (two) times daily. for 10 days    cetirizine (ZYRTEC) 5 MG tablet Take 1 tablet (5 mg total) by mouth daily as needed for Allergies. (Patient not taking: Reported on 8/10/2020)    HYDROcodone-acetaminophen (NORCO) 5-325 mg per tablet Take 1 tablet by mouth every 6 (six) hours as needed for Pain.     No current facility-administered medications for this visit.          Review of Systems:  Review of Systems   Constitutional: Positive for chills and fever. Negative for activity change, appetite change, fatigue and unexpected weight change.   Respiratory: Negative for cough, shortness of breath and wheezing.    Cardiovascular: Negative for chest pain and leg swelling.   Gastrointestinal: Positive for nausea. Negative for abdominal pain, constipation, diarrhea and vomiting.   Endocrine: Negative for hair loss and hot flashes.   Genitourinary: Negative for decreased libido, dyspareunia, dysuria, frequency, menstrual problem, pelvic pain, vaginal bleeding, vaginal discharge and vaginal pain.   Integumentary:  Negative for acne, hair changes, nipple discharge and breast skin changes.   Neurological: Negative for headaches.   Psychiatric/Behavioral: Negative for sleep disturbance.   Breast: Negative for mastodynia, nipple discharge and skin changes       OBJECTIVE:     Physical Exam:  Physical Exam  Constitutional:       Appearance: She is well-developed.   HENT:      Head: Normocephalic and atraumatic.   Eyes:      General: No scleral icterus.        Right eye: No discharge.         Left eye: No discharge.      Conjunctiva/sclera: Conjunctivae normal.   Pulmonary:      Effort: Pulmonary effort is normal.       Breath sounds: No stridor.   Chest:       Abdominal:      General: There is no distension.      Palpations: Abdomen is soft.      Tenderness: There is no abdominal tenderness.   Genitourinary:      Musculoskeletal: Normal range of motion.   Skin:     General: Skin is warm and dry.   Neurological:      Mental Status: She is alert and oriented to person, place, and time.   Psychiatric:         Behavior: Behavior normal.         Thought Content: Thought content normal.         Judgment: Judgment normal.           ASSESSMENT:       ICD-10-CM ICD-9-CM    1. Left buttock abscess  L02.31 682.5 HYDROcodone-acetaminophen (NORCO) 5-325 mg per tablet      Aerobic culture          Plan:      Sherrie was seen today for procedure.    Diagnoses and all orders for this visit:    Left buttock abscess  - Pt with left buttocks abscess.  Pt has been on flagyl and Bactrim and lesion has worsened  - Discussed the need for I&D of the area.  Will also get cultures to make sure bacteria sensitive to the abx.  Unable to find anaerobic culture in clinic so only able to collect aerobic culture  - Discussed the possible need to switch abx depending on the culture and her progress  - I&D performed (see procedure note).  Area was packed and will have packing changed QD.    - Plan to have patient follow up Monday with Dr. Jeansonne.  - Given that patient feels that the axilla lesion has improved, will reassess on Monday.  If still present and not improving, will likely need I&D.  Will plan to send to Derm for this.   - Given Norco for pain control  -     HYDROcodone-acetaminophen (NORCO) 5-325 mg per tablet; Take 1 tablet by mouth every 6 (six) hours as needed for Pain.  -     Aerobic culture          Orders Placed This Encounter   Procedures    Aerobic culture       Follow up in about 4 days (around 10/19/2020).     Counseling time: 30 minutes    Mariam Benson

## 2020-10-15 NOTE — PROCEDURES
Procedures     PROCEDURE:  Incision and Drainage of left buttocks abscess    Date: 10/15/20    Pre-Procedure Counseling:  The procedure was explained to the patient in detail, including the pros and cons of the I&D.  All questions were answered.  Written consents were reviewed and signed.    Procedure in Detail:    A time out was performed with verification of the patient and the procedure.  The area was cleaned with alcohol swab and was injected with 4cc of 1% lidocaine with epinephrine.  The area was then cleaned with betadine x3.  Once verifying that the area was adequately anesthestized from the lidocaine, an 11 blade scalpel was used to make a 1.5cm incision in the abscess.  Moderate amount of purulent discharge was drained from the area.  An aerobic culture was collected.  We did not have anaerobic cultures in clinic to collect an anaerobic culture.  The area was probed and moderate amount of infected adipose tissue was removed the area.  The abscess was noted to have a pocket approximately  1.5 x1.5 cm in size.  Surrounding induration and erythema noted, with a total size of the affected area approximately 4x5cm.  Hemostats used to probe the surrounding induration through the opening of the abscess and no further pockets of fluid were noted that needed draining.  The decision was made to pack the area with 0.25cm packing.  The area was covered with gauze and covered with tape.      The patient tolerated the procedure well.     Post- Procedure Counseling:  - Pt given instructions to follow up with any issues, fever/chills, worsening pain  - Norco was given for pain control  - Bactrim and flagyl was given for antibiotics  - Discussed removing and repacking once a day.  Supplies given to patient.  Patient's friend instructed how to do the packing  - Pt to follow up on Monday    Mariam Benson MD  Obstetrics & Gynecology

## 2020-10-16 NOTE — TELEPHONE ENCOUNTER
Spoke with Dr. Benson and she advised ok for pt to take shower and clean with peroxide, avoid baths. Pt verbalized understanding.

## 2020-10-18 DIAGNOSIS — Z01.84 ANTIBODY RESPONSE EXAMINATION: ICD-10-CM

## 2020-10-19 ENCOUNTER — OFFICE VISIT (OUTPATIENT)
Dept: OBSTETRICS AND GYNECOLOGY | Facility: CLINIC | Age: 30
End: 2020-10-19
Payer: COMMERCIAL

## 2020-10-19 VITALS
WEIGHT: 134.5 LBS | DIASTOLIC BLOOD PRESSURE: 66 MMHG | SYSTOLIC BLOOD PRESSURE: 110 MMHG | HEIGHT: 63 IN | BODY MASS INDEX: 23.83 KG/M2

## 2020-10-19 DIAGNOSIS — L03.317 CELLULITIS AND ABSCESS OF BUTTOCK: Primary | ICD-10-CM

## 2020-10-19 DIAGNOSIS — L02.419 AXILLARY ABSCESS: ICD-10-CM

## 2020-10-19 DIAGNOSIS — L02.31 CELLULITIS AND ABSCESS OF BUTTOCK: Primary | ICD-10-CM

## 2020-10-19 LAB — BACTERIA SPEC AEROBE CULT: ABNORMAL

## 2020-10-19 PROCEDURE — 99213 PR OFFICE/OUTPT VISIT, EST, LEVL III, 20-29 MIN: ICD-10-PCS | Mod: 24,S$GLB,, | Performed by: OBSTETRICS & GYNECOLOGY

## 2020-10-19 PROCEDURE — 99999 PR PBB SHADOW E&M-EST. PATIENT-LVL III: ICD-10-PCS | Mod: PBBFAC,,, | Performed by: OBSTETRICS & GYNECOLOGY

## 2020-10-19 PROCEDURE — 3008F PR BODY MASS INDEX (BMI) DOCUMENTED: ICD-10-PCS | Mod: CPTII,S$GLB,, | Performed by: OBSTETRICS & GYNECOLOGY

## 2020-10-19 PROCEDURE — 3008F BODY MASS INDEX DOCD: CPT | Mod: CPTII,S$GLB,, | Performed by: OBSTETRICS & GYNECOLOGY

## 2020-10-19 PROCEDURE — 99999 PR PBB SHADOW E&M-EST. PATIENT-LVL III: CPT | Mod: PBBFAC,,, | Performed by: OBSTETRICS & GYNECOLOGY

## 2020-10-19 PROCEDURE — 99213 OFFICE O/P EST LOW 20 MIN: CPT | Mod: 24,S$GLB,, | Performed by: OBSTETRICS & GYNECOLOGY

## 2020-10-19 RX ORDER — OXYCODONE AND ACETAMINOPHEN 5; 325 MG/1; MG/1
1 TABLET ORAL EVERY 6 HOURS PRN
Qty: 15 EACH | Refills: 0 | Status: SHIPPED | OUTPATIENT
Start: 2020-10-19 | End: 2021-01-15

## 2020-10-19 RX ORDER — CEPHALEXIN 500 MG/1
500 CAPSULE ORAL 4 TIMES DAILY
Qty: 40 CAPSULE | Refills: 0 | Status: SHIPPED | OUTPATIENT
Start: 2020-10-19 | End: 2020-10-29

## 2020-10-19 NOTE — PROGRESS NOTES
CC: Buttocks and axilla abscess follow up    Sherrie presents for follow up after buttocks abscess was drained 5 days ago in clinic by Dr. Benson. Cultures grew back staph aureus. She recently completed bactrim course. Her neighbor who is a PT has been packing the area for her daily and says the depth of the area is improving. She does have a great deal of pain with sitting and when the wound is packed. She states the area on her axilla is improving. No fevers.     ROS:  GENERAL: No fever, chills, fatigability or weight loss.  VULVAR: No pain, no lesions and no itching.  VAGINAL: No relaxation, no itching, no discharge, no abnl bleeding and no lesions.  ABDOMEN: no abdominal pain. Denies nausea. Denies vomiting. No diarrhea. No constipation  BREAST: Denies pain. No lumps. No discharge.  URINARY: No incontinence, no nocturia, no frequency and no dysuria.  CARDIOVASCULAR: No chest pain. No shortness of breath. No leg cramps.  NEUROLOGICAL: No headaches. No vision changes.    OB History    No obstetric history on file.       Past Medical History:   Diagnosis Date    Allergy     Allergy to animals 7/14/2020    Asthma     Family history of melanoma 7/14/2020    Malaria 2010    Was only on prophylatic malarial drug.     Past Surgical History:   Procedure Laterality Date    TYMPANOSTOMY TUBE PLACEMENT         Current Outpatient Medications:     fluocinonide (LIDEX) 0.05 % external solution, Use as bedtime for scalp, Disp: 60 mL, Rfl: 6    HYDROcodone-acetaminophen (NORCO) 5-325 mg per tablet, Take 1 tablet by mouth every 6 (six) hours as needed for Pain., Disp: 20 tablet, Rfl: 0    MULTIVITAMIN ORAL, Take by mouth., Disp: , Rfl:     sulfamethoxazole-trimethoprim 800-160mg (BACTRIM DS) 800-160 mg Tab, Take 1 tablet by mouth 2 (two) times daily. for 10 days, Disp: 20 tablet, Rfl: 0    cephALEXin (KEFLEX) 500 MG capsule, Take 1 capsule (500 mg total) by mouth 4 (four) times daily. for 10 days, Disp: 40 capsule, Rfl:  0    cetirizine (ZYRTEC) 5 MG tablet, Take 1 tablet (5 mg total) by mouth daily as needed for Allergies. (Patient not taking: Reported on 8/10/2020), Disp: , Rfl: 0    metroNIDAZOLE (FLAGYL) 500 MG tablet, Take 1 tablet (500 mg total) by mouth every 12 (twelve) hours. for 7 days (Patient not taking: Reported on 10/19/2020), Disp: 14 tablet, Rfl: 0    oxyCODONE-acetaminophen (PERCOCET) 5-325 mg per tablet, Take 1 tablet by mouth every 6 (six) hours as needed for Pain., Disp: 15 each, Rfl: 0  Review of patient's allergies indicates:  No Known Allergies        Vitals:    10/19/20 1336   BP: 110/66       PHYSICAL EXAM:  GEN: NAD  Resp: nl effort  Arm: 3cm palpable area right axilla with mild TTP, no drainage, + erythema.  Abd: soft,NT  Buttocks: packing removed, about 1cm in depth with some purulent drainage noted. No surrounding erythema. Area irrigated and repacked.   Psych: nl affect  Neuro: no focal deficits  Skin: warm and dry    ASSESSMENT and PLAN:  1) Buttocks abscess  -Keflex x 10 days sent. Percocet #15 sent. Advised to continue daily irrigation and packing. Given precautions.     2) Axillary cellulitis  -Ok to monitor for now, f/u derm if worsens.       FOLLOW UP: 10-14 days to reassess healing.

## 2020-10-23 LAB
HPV HR 12 DNA SPEC QL NAA+PROBE: NEGATIVE
HPV16 AG SPEC QL: NEGATIVE
HPV18 DNA SPEC QL NAA+PROBE: NEGATIVE

## 2020-10-28 ENCOUNTER — OFFICE VISIT (OUTPATIENT)
Dept: OBSTETRICS AND GYNECOLOGY | Facility: CLINIC | Age: 30
End: 2020-10-28
Payer: COMMERCIAL

## 2020-10-28 VITALS
SYSTOLIC BLOOD PRESSURE: 100 MMHG | WEIGHT: 128.75 LBS | BODY MASS INDEX: 22.81 KG/M2 | HEIGHT: 63 IN | DIASTOLIC BLOOD PRESSURE: 68 MMHG

## 2020-10-28 DIAGNOSIS — L02.31 CELLULITIS AND ABSCESS OF BUTTOCK: Primary | ICD-10-CM

## 2020-10-28 DIAGNOSIS — L03.317 CELLULITIS AND ABSCESS OF BUTTOCK: Primary | ICD-10-CM

## 2020-10-28 PROCEDURE — 99999 PR PBB SHADOW E&M-EST. PATIENT-LVL III: ICD-10-PCS | Mod: PBBFAC,,, | Performed by: OBSTETRICS & GYNECOLOGY

## 2020-10-28 PROCEDURE — 3008F PR BODY MASS INDEX (BMI) DOCUMENTED: ICD-10-PCS | Mod: CPTII,S$GLB,, | Performed by: OBSTETRICS & GYNECOLOGY

## 2020-10-28 PROCEDURE — 99999 PR PBB SHADOW E&M-EST. PATIENT-LVL III: CPT | Mod: PBBFAC,,, | Performed by: OBSTETRICS & GYNECOLOGY

## 2020-10-28 PROCEDURE — 99213 OFFICE O/P EST LOW 20 MIN: CPT | Mod: S$GLB,,, | Performed by: OBSTETRICS & GYNECOLOGY

## 2020-10-28 PROCEDURE — 99213 PR OFFICE/OUTPT VISIT, EST, LEVL III, 20-29 MIN: ICD-10-PCS | Mod: S$GLB,,, | Performed by: OBSTETRICS & GYNECOLOGY

## 2020-10-28 PROCEDURE — 3008F BODY MASS INDEX DOCD: CPT | Mod: CPTII,S$GLB,, | Performed by: OBSTETRICS & GYNECOLOGY

## 2020-10-28 NOTE — PROGRESS NOTES
CC: Buttocks and axilla abscess follow up     Sherrie presents for follow up after buttocks abscess was drained about 2 weeks ago in clinic by Dr. Benson. Cultures grew back staph aureus. She recently completed bactrim course and is completing keflex course tomorrow. Her neighbor who is a PT has been packing the area for her daily and says the depth of the area is improving. She states pain has much improved since last time and she can now sit without difficulty. She states the area on her axilla is improving. No fevers.      ROS:  GENERAL: No fever, chills, fatigability or weight loss.  VULVAR: No pain, no lesions and no itching.  VAGINAL: No relaxation, no itching, no discharge, no abnl bleeding and no lesions.  ABDOMEN: no abdominal pain. Denies nausea. Denies vomiting. No diarrhea. No constipation  BREAST: Denies pain. No lumps. No discharge.  URINARY: No incontinence, no nocturia, no frequency and no dysuria.  CARDIOVASCULAR: No chest pain. No shortness of breath. No leg cramps.  NEUROLOGICAL: No headaches. No vision changes.     OB History    No obstetric history on file.              Past Medical History:   Diagnosis Date    Allergy      Allergy to animals 7/14/2020    Asthma      Family history of melanoma 7/14/2020    Malaria 2010     Was only on prophylatic malarial drug.            Past Surgical History:   Procedure Laterality Date    TYMPANOSTOMY TUBE PLACEMENT             Current Outpatient Medications:     fluocinonide (LIDEX) 0.05 % external solution, Use as bedtime for scalp, Disp: 60 mL, Rfl: 6    HYDROcodone-acetaminophen (NORCO) 5-325 mg per tablet, Take 1 tablet by mouth every 6 (six) hours as needed for Pain., Disp: 20 tablet, Rfl: 0    MULTIVITAMIN ORAL, Take by mouth., Disp: , Rfl:     sulfamethoxazole-trimethoprim 800-160mg (BACTRIM DS) 800-160 mg Tab, Take 1 tablet by mouth 2 (two) times daily. for 10 days, Disp: 20 tablet, Rfl: 0    cephALEXin (KEFLEX) 500 MG capsule, Take 1  capsule (500 mg total) by mouth 4 (four) times daily. for 10 days, Disp: 40 capsule, Rfl: 0    cetirizine (ZYRTEC) 5 MG tablet, Take 1 tablet (5 mg total) by mouth daily as needed for Allergies. (Patient not taking: Reported on 8/10/2020), Disp: , Rfl: 0    metroNIDAZOLE (FLAGYL) 500 MG tablet, Take 1 tablet (500 mg total) by mouth every 12 (twelve) hours. for 7 days (Patient not taking: Reported on 10/19/2020), Disp: 14 tablet, Rfl: 0    oxyCODONE-acetaminophen (PERCOCET) 5-325 mg per tablet, Take 1 tablet by mouth every 6 (six) hours as needed for Pain., Disp: 15 each, Rfl: 0  Review of patient's allergies indicates:  No Known Allergies        Vitals:    10/28/20 0924   BP: 100/68          PHYSICAL EXAM:  GEN: NAD  Resp: nl effort  Arm: axillary swelling has resolved  Abd: soft,NT  Buttocks: packing removed, about .1cm in depth with no pus noted. No surrounding erythema. Area irrigated and bandage applied.  Psych: nl affect  Neuro: no focal deficits  Skin: warm and dry     ASSESSMENT and PLAN:  1) Buttocks abscess  -Advised she can stop packing as area now superficial. Complete all of bactrim course.      2) Axillary cellulitis  -Improved.         FOLLOW UP: prn

## 2020-11-06 LAB
FINAL PATHOLOGIC DIAGNOSIS: NORMAL
Lab: NORMAL

## 2020-11-17 DIAGNOSIS — Z01.84 ANTIBODY RESPONSE EXAMINATION: ICD-10-CM

## 2020-12-23 ENCOUNTER — IMMUNIZATION (OUTPATIENT)
Dept: INTERNAL MEDICINE | Facility: CLINIC | Age: 30
End: 2020-12-23
Payer: COMMERCIAL

## 2020-12-23 DIAGNOSIS — Z23 NEED FOR VACCINATION: ICD-10-CM

## 2020-12-23 PROCEDURE — 0001A COVID-19, MRNA, LNP-S, PF, 30 MCG/0.3 ML DOSE VACCINE: CPT | Mod: CV19,,, | Performed by: INTERNAL MEDICINE

## 2020-12-23 PROCEDURE — 91300 COVID-19, MRNA, LNP-S, PF, 30 MCG/0.3 ML DOSE VACCINE: ICD-10-PCS | Mod: ,,, | Performed by: INTERNAL MEDICINE

## 2020-12-23 PROCEDURE — 91300 COVID-19, MRNA, LNP-S, PF, 30 MCG/0.3 ML DOSE VACCINE: CPT | Mod: ,,, | Performed by: INTERNAL MEDICINE

## 2020-12-23 PROCEDURE — 0001A COVID-19, MRNA, LNP-S, PF, 30 MCG/0.3 ML DOSE VACCINE: ICD-10-PCS | Mod: CV19,,, | Performed by: INTERNAL MEDICINE

## 2021-01-13 ENCOUNTER — IMMUNIZATION (OUTPATIENT)
Dept: INTERNAL MEDICINE | Facility: CLINIC | Age: 31
End: 2021-01-13
Payer: COMMERCIAL

## 2021-01-13 DIAGNOSIS — Z23 NEED FOR VACCINATION: ICD-10-CM

## 2021-01-13 PROCEDURE — 0002A COVID-19, MRNA, LNP-S, PF, 30 MCG/0.3 ML DOSE VACCINE: CPT | Mod: PBBFAC | Performed by: INTERNAL MEDICINE

## 2021-01-13 PROCEDURE — 91300 COVID-19, MRNA, LNP-S, PF, 30 MCG/0.3 ML DOSE VACCINE: CPT | Mod: PBBFAC | Performed by: INTERNAL MEDICINE

## 2021-01-15 ENCOUNTER — LAB VISIT (OUTPATIENT)
Dept: LAB | Facility: OTHER | Age: 31
End: 2021-01-15
Attending: INTERNAL MEDICINE
Payer: COMMERCIAL

## 2021-01-15 ENCOUNTER — OFFICE VISIT (OUTPATIENT)
Dept: INTERNAL MEDICINE | Facility: CLINIC | Age: 31
End: 2021-01-15
Payer: COMMERCIAL

## 2021-01-15 VITALS
OXYGEN SATURATION: 99 % | WEIGHT: 132.69 LBS | BODY MASS INDEX: 22.65 KG/M2 | SYSTOLIC BLOOD PRESSURE: 90 MMHG | DIASTOLIC BLOOD PRESSURE: 60 MMHG | HEART RATE: 71 BPM | HEIGHT: 64 IN

## 2021-01-15 DIAGNOSIS — Z83.2 FAMILY HISTORY OF AUTOIMMUNE DISORDER: ICD-10-CM

## 2021-01-15 DIAGNOSIS — J30.9 ALLERGIC RHINITIS, UNSPECIFIED SEASONALITY, UNSPECIFIED TRIGGER: ICD-10-CM

## 2021-01-15 DIAGNOSIS — Z00.00 ANNUAL PHYSICAL EXAM: ICD-10-CM

## 2021-01-15 DIAGNOSIS — Z23 INFLUENZA VACCINE NEEDED: ICD-10-CM

## 2021-01-15 DIAGNOSIS — F43.9 STRESS: ICD-10-CM

## 2021-01-15 DIAGNOSIS — Z00.00 ANNUAL PHYSICAL EXAM: Primary | ICD-10-CM

## 2021-01-15 DIAGNOSIS — Z80.8 FAMILY HISTORY OF MELANOMA: ICD-10-CM

## 2021-01-15 PROBLEM — S62.91XA: Status: RESOLVED | Noted: 2020-01-01 | Resolved: 2021-01-15

## 2021-01-15 PROBLEM — J45.20 MILD INTERMITTENT ASTHMA WITHOUT COMPLICATION: Status: ACTIVE | Noted: 2021-01-15

## 2021-01-15 LAB
ALBUMIN SERPL BCP-MCNC: 3.9 G/DL (ref 3.5–5.2)
ALP SERPL-CCNC: 49 U/L (ref 55–135)
ALT SERPL W/O P-5'-P-CCNC: 10 U/L (ref 10–44)
ANION GAP SERPL CALC-SCNC: 9 MMOL/L (ref 8–16)
AST SERPL-CCNC: 14 U/L (ref 10–40)
BASOPHILS # BLD AUTO: 0.06 K/UL (ref 0–0.2)
BASOPHILS NFR BLD: 1 % (ref 0–1.9)
BILIRUB SERPL-MCNC: 0.4 MG/DL (ref 0.1–1)
BUN SERPL-MCNC: 6 MG/DL (ref 6–20)
CALCIUM SERPL-MCNC: 8.9 MG/DL (ref 8.7–10.5)
CHLORIDE SERPL-SCNC: 106 MMOL/L (ref 95–110)
CO2 SERPL-SCNC: 25 MMOL/L (ref 23–29)
CREAT SERPL-MCNC: 0.8 MG/DL (ref 0.5–1.4)
DIFFERENTIAL METHOD: NORMAL
EOSINOPHIL # BLD AUTO: 0.3 K/UL (ref 0–0.5)
EOSINOPHIL NFR BLD: 4.8 % (ref 0–8)
ERYTHROCYTE [DISTWIDTH] IN BLOOD BY AUTOMATED COUNT: 12.9 % (ref 11.5–14.5)
EST. GFR  (AFRICAN AMERICAN): >60 ML/MIN/1.73 M^2
EST. GFR  (NON AFRICAN AMERICAN): >60 ML/MIN/1.73 M^2
GLUCOSE SERPL-MCNC: 84 MG/DL (ref 70–110)
HCT VFR BLD AUTO: 41.4 % (ref 37–48.5)
HGB BLD-MCNC: 13.4 G/DL (ref 12–16)
IMM GRANULOCYTES # BLD AUTO: 0.01 K/UL (ref 0–0.04)
IMM GRANULOCYTES NFR BLD AUTO: 0.2 % (ref 0–0.5)
LYMPHOCYTES # BLD AUTO: 1.7 K/UL (ref 1–4.8)
LYMPHOCYTES NFR BLD: 29.4 % (ref 18–48)
MCH RBC QN AUTO: 30.4 PG (ref 27–31)
MCHC RBC AUTO-ENTMCNC: 32.4 G/DL (ref 32–36)
MCV RBC AUTO: 94 FL (ref 82–98)
MONOCYTES # BLD AUTO: 0.7 K/UL (ref 0.3–1)
MONOCYTES NFR BLD: 11.3 % (ref 4–15)
NEUTROPHILS # BLD AUTO: 3.1 K/UL (ref 1.8–7.7)
NEUTROPHILS NFR BLD: 53.3 % (ref 38–73)
NRBC BLD-RTO: 0 /100 WBC
PLATELET # BLD AUTO: 295 K/UL (ref 150–350)
PMV BLD AUTO: 9.9 FL (ref 9.2–12.9)
POTASSIUM SERPL-SCNC: 4.4 MMOL/L (ref 3.5–5.1)
PROT SERPL-MCNC: 6.9 G/DL (ref 6–8.4)
RBC # BLD AUTO: 4.41 M/UL (ref 4–5.4)
SODIUM SERPL-SCNC: 140 MMOL/L (ref 136–145)
TSH SERPL DL<=0.005 MIU/L-ACNC: 2.03 UIU/ML (ref 0.4–4)
WBC # BLD AUTO: 5.85 K/UL (ref 3.9–12.7)

## 2021-01-15 PROCEDURE — 3008F PR BODY MASS INDEX (BMI) DOCUMENTED: ICD-10-PCS | Mod: CPTII,S$GLB,, | Performed by: INTERNAL MEDICINE

## 2021-01-15 PROCEDURE — 99395 PREV VISIT EST AGE 18-39: CPT | Mod: S$GLB,,, | Performed by: INTERNAL MEDICINE

## 2021-01-15 PROCEDURE — 1126F AMNT PAIN NOTED NONE PRSNT: CPT | Mod: S$GLB,,, | Performed by: INTERNAL MEDICINE

## 2021-01-15 PROCEDURE — 1126F PR PAIN SEVERITY QUANTIFIED, NO PAIN PRESENT: ICD-10-PCS | Mod: S$GLB,,, | Performed by: INTERNAL MEDICINE

## 2021-01-15 PROCEDURE — 3008F BODY MASS INDEX DOCD: CPT | Mod: CPTII,S$GLB,, | Performed by: INTERNAL MEDICINE

## 2021-01-15 PROCEDURE — 83036 HEMOGLOBIN GLYCOSYLATED A1C: CPT

## 2021-01-15 PROCEDURE — 85025 COMPLETE CBC W/AUTO DIFF WBC: CPT

## 2021-01-15 PROCEDURE — 84443 ASSAY THYROID STIM HORMONE: CPT

## 2021-01-15 PROCEDURE — 99999 PR PBB SHADOW E&M-EST. PATIENT-LVL III: ICD-10-PCS | Mod: PBBFAC,,, | Performed by: INTERNAL MEDICINE

## 2021-01-15 PROCEDURE — 99395 PR PREVENTIVE VISIT,EST,18-39: ICD-10-PCS | Mod: S$GLB,,, | Performed by: INTERNAL MEDICINE

## 2021-01-15 PROCEDURE — 36415 COLL VENOUS BLD VENIPUNCTURE: CPT

## 2021-01-15 PROCEDURE — 99999 PR PBB SHADOW E&M-EST. PATIENT-LVL III: CPT | Mod: PBBFAC,,, | Performed by: INTERNAL MEDICINE

## 2021-01-15 PROCEDURE — 80053 COMPREHEN METABOLIC PANEL: CPT

## 2021-01-16 LAB
ESTIMATED AVG GLUCOSE: 88 MG/DL (ref 68–131)
HBA1C MFR BLD HPLC: 4.7 % (ref 4–5.6)

## 2021-02-10 ENCOUNTER — PATIENT OUTREACH (OUTPATIENT)
Dept: ADMINISTRATIVE | Facility: OTHER | Age: 31
End: 2021-02-10

## 2021-02-10 ENCOUNTER — PATIENT MESSAGE (OUTPATIENT)
Dept: DERMATOLOGY | Facility: CLINIC | Age: 31
End: 2021-02-10

## 2021-02-11 ENCOUNTER — OFFICE VISIT (OUTPATIENT)
Dept: DERMATOLOGY | Facility: CLINIC | Age: 31
End: 2021-02-11
Payer: COMMERCIAL

## 2021-02-11 VITALS — TEMPERATURE: 98 F

## 2021-02-11 DIAGNOSIS — Z22.322 MRSA CARRIER: ICD-10-CM

## 2021-02-11 DIAGNOSIS — L03.811 CELLULITIS OF SCALP: Primary | ICD-10-CM

## 2021-02-11 DIAGNOSIS — A49.02 MRSA INFECTION: ICD-10-CM

## 2021-02-11 PROCEDURE — 1125F AMNT PAIN NOTED PAIN PRSNT: CPT | Mod: S$GLB,,, | Performed by: DERMATOLOGY

## 2021-02-11 PROCEDURE — 99214 PR OFFICE/OUTPT VISIT, EST, LEVL IV, 30-39 MIN: ICD-10-PCS | Mod: S$GLB,,, | Performed by: DERMATOLOGY

## 2021-02-11 PROCEDURE — 1125F PR PAIN SEVERITY QUANTIFIED, PAIN PRESENT: ICD-10-PCS | Mod: S$GLB,,, | Performed by: DERMATOLOGY

## 2021-02-11 PROCEDURE — 99214 OFFICE O/P EST MOD 30 MIN: CPT | Mod: S$GLB,,, | Performed by: DERMATOLOGY

## 2021-02-11 RX ORDER — DOXYCYCLINE 100 MG/1
TABLET ORAL
Qty: 60 TABLET | Refills: 0 | Status: SHIPPED | OUTPATIENT
Start: 2021-02-11 | End: 2021-04-13 | Stop reason: SDUPTHER

## 2021-03-01 ENCOUNTER — PATIENT MESSAGE (OUTPATIENT)
Dept: INTERNAL MEDICINE | Facility: CLINIC | Age: 31
End: 2021-03-01

## 2021-03-01 DIAGNOSIS — Z00.00 ANNUAL PHYSICAL EXAM: Primary | ICD-10-CM

## 2021-03-01 DIAGNOSIS — Z71.84 ENCOUNTER FOR COUNSELING FOR TRAVEL: ICD-10-CM

## 2021-03-09 ENCOUNTER — PATIENT MESSAGE (OUTPATIENT)
Dept: DERMATOLOGY | Facility: CLINIC | Age: 31
End: 2021-03-09

## 2021-03-10 ENCOUNTER — CLINICAL SUPPORT (OUTPATIENT)
Dept: URGENT CARE | Facility: CLINIC | Age: 31
End: 2021-03-10
Payer: COMMERCIAL

## 2021-03-10 DIAGNOSIS — Z20.822 ENCOUNTER FOR LABORATORY TESTING FOR COVID-19 VIRUS: ICD-10-CM

## 2021-03-10 PROCEDURE — 99211 OFF/OP EST MAY X REQ PHY/QHP: CPT | Mod: S$GLB,CS,, | Performed by: PHYSICIAN ASSISTANT

## 2021-03-10 PROCEDURE — U0003 INFECTIOUS AGENT DETECTION BY NUCLEIC ACID (DNA OR RNA); SEVERE ACUTE RESPIRATORY SYNDROME CORONAVIRUS 2 (SARS-COV-2) (CORONAVIRUS DISEASE [COVID-19]), AMPLIFIED PROBE TECHNIQUE, MAKING USE OF HIGH THROUGHPUT TECHNOLOGIES AS DESCRIBED BY CMS-2020-01-R: HCPCS | Performed by: PHYSICIAN ASSISTANT

## 2021-03-10 PROCEDURE — U0005 INFEC AGEN DETEC AMPLI PROBE: HCPCS | Performed by: PHYSICIAN ASSISTANT

## 2021-03-10 PROCEDURE — 99211 PR OFFICE/OUTPT VISIT, EST, LEVL I: ICD-10-PCS | Mod: S$GLB,CS,, | Performed by: PHYSICIAN ASSISTANT

## 2021-03-11 ENCOUNTER — OFFICE VISIT (OUTPATIENT)
Dept: DERMATOLOGY | Facility: CLINIC | Age: 31
End: 2021-03-11
Payer: COMMERCIAL

## 2021-03-11 VITALS — TEMPERATURE: 98 F

## 2021-03-11 DIAGNOSIS — B99.9 RECURRENT INFECTIONS: Primary | ICD-10-CM

## 2021-03-11 DIAGNOSIS — L65.9 ALOPECIA OF SCALP: ICD-10-CM

## 2021-03-11 DIAGNOSIS — Z22.322 SUSPECTED CARRIER OF METHICILLIN RESISTANT STAPHYLOCOCCUS AUREUS (MRSA): ICD-10-CM

## 2021-03-11 LAB — SARS-COV-2 RNA RESP QL NAA+PROBE: NOT DETECTED

## 2021-03-11 PROCEDURE — 99213 PR OFFICE/OUTPT VISIT, EST, LEVL III, 20-29 MIN: ICD-10-PCS | Mod: S$GLB,,, | Performed by: DERMATOLOGY

## 2021-03-11 PROCEDURE — 1126F PR PAIN SEVERITY QUANTIFIED, NO PAIN PRESENT: ICD-10-PCS | Mod: S$GLB,,, | Performed by: DERMATOLOGY

## 2021-03-11 PROCEDURE — 99213 OFFICE O/P EST LOW 20 MIN: CPT | Mod: S$GLB,,, | Performed by: DERMATOLOGY

## 2021-03-11 PROCEDURE — 1126F AMNT PAIN NOTED NONE PRSNT: CPT | Mod: S$GLB,,, | Performed by: DERMATOLOGY

## 2021-03-11 RX ORDER — MUPIROCIN 20 MG/G
OINTMENT TOPICAL
Qty: 22 G | Refills: 1 | Status: SHIPPED | OUTPATIENT
Start: 2021-03-11 | End: 2021-09-24

## 2021-04-05 ENCOUNTER — PATIENT MESSAGE (OUTPATIENT)
Dept: DERMATOLOGY | Facility: CLINIC | Age: 31
End: 2021-04-05

## 2021-04-11 ENCOUNTER — PATIENT MESSAGE (OUTPATIENT)
Dept: DERMATOLOGY | Facility: CLINIC | Age: 31
End: 2021-04-11

## 2021-04-12 ENCOUNTER — PATIENT OUTREACH (OUTPATIENT)
Dept: ADMINISTRATIVE | Facility: OTHER | Age: 31
End: 2021-04-12

## 2021-04-13 ENCOUNTER — OFFICE VISIT (OUTPATIENT)
Dept: DERMATOLOGY | Facility: CLINIC | Age: 31
End: 2021-04-13
Payer: COMMERCIAL

## 2021-04-13 ENCOUNTER — OFFICE VISIT (OUTPATIENT)
Dept: INFECTIOUS DISEASES | Facility: CLINIC | Age: 31
End: 2021-04-13
Payer: COMMERCIAL

## 2021-04-13 VITALS
WEIGHT: 131.19 LBS | SYSTOLIC BLOOD PRESSURE: 124 MMHG | BODY MASS INDEX: 22.4 KG/M2 | DIASTOLIC BLOOD PRESSURE: 87 MMHG | TEMPERATURE: 98 F | HEART RATE: 89 BPM | HEIGHT: 64 IN

## 2021-04-13 DIAGNOSIS — A49.02 MRSA INFECTION: ICD-10-CM

## 2021-04-13 DIAGNOSIS — B99.9 RECURRENT INFECTIONS: Primary | ICD-10-CM

## 2021-04-13 DIAGNOSIS — Z22.322 SUSPECTED CARRIER OF METHICILLIN RESISTANT STAPHYLOCOCCUS AUREUS (MRSA): ICD-10-CM

## 2021-04-13 DIAGNOSIS — L21.9 SEBORRHEIC DERMATITIS OF SCALP: Primary | ICD-10-CM

## 2021-04-13 DIAGNOSIS — B99.9 RECURRENT INFECTIONS: ICD-10-CM

## 2021-04-13 DIAGNOSIS — R59.1 LYMPHADENOPATHY OF HEAD AND NECK: ICD-10-CM

## 2021-04-13 PROCEDURE — 99203 OFFICE O/P NEW LOW 30 MIN: CPT | Mod: S$GLB,,, | Performed by: PHYSICIAN ASSISTANT

## 2021-04-13 PROCEDURE — 3008F PR BODY MASS INDEX (BMI) DOCUMENTED: ICD-10-PCS | Mod: CPTII,S$GLB,, | Performed by: PHYSICIAN ASSISTANT

## 2021-04-13 PROCEDURE — 87070 CULTURE OTHR SPECIMN AEROBIC: CPT | Performed by: PHYSICIAN ASSISTANT

## 2021-04-13 PROCEDURE — 87102 FUNGUS ISOLATION CULTURE: CPT | Performed by: PHYSICIAN ASSISTANT

## 2021-04-13 PROCEDURE — 1126F AMNT PAIN NOTED NONE PRSNT: CPT | Mod: S$GLB,,, | Performed by: PHYSICIAN ASSISTANT

## 2021-04-13 PROCEDURE — 99203 PR OFFICE/OUTPT VISIT, NEW, LEVL III, 30-44 MIN: ICD-10-PCS | Mod: S$GLB,,, | Performed by: PHYSICIAN ASSISTANT

## 2021-04-13 PROCEDURE — 3008F BODY MASS INDEX DOCD: CPT | Mod: CPTII,S$GLB,, | Performed by: PHYSICIAN ASSISTANT

## 2021-04-13 PROCEDURE — 87205 SMEAR GRAM STAIN: CPT | Performed by: PHYSICIAN ASSISTANT

## 2021-04-13 PROCEDURE — 1126F PR PAIN SEVERITY QUANTIFIED, NO PAIN PRESENT: ICD-10-PCS | Mod: S$GLB,,, | Performed by: PHYSICIAN ASSISTANT

## 2021-04-13 PROCEDURE — 99999 PR PBB SHADOW E&M-EST. PATIENT-LVL III: ICD-10-PCS | Mod: PBBFAC,,, | Performed by: PHYSICIAN ASSISTANT

## 2021-04-13 PROCEDURE — 99214 PR OFFICE/OUTPT VISIT, EST, LEVL IV, 30-39 MIN: ICD-10-PCS | Mod: S$GLB,,, | Performed by: DERMATOLOGY

## 2021-04-13 PROCEDURE — 99999 PR PBB SHADOW E&M-EST. PATIENT-LVL III: CPT | Mod: PBBFAC,,, | Performed by: PHYSICIAN ASSISTANT

## 2021-04-13 PROCEDURE — 1125F AMNT PAIN NOTED PAIN PRSNT: CPT | Mod: S$GLB,,, | Performed by: DERMATOLOGY

## 2021-04-13 PROCEDURE — 99214 OFFICE O/P EST MOD 30 MIN: CPT | Mod: S$GLB,,, | Performed by: DERMATOLOGY

## 2021-04-13 PROCEDURE — 1125F PR PAIN SEVERITY QUANTIFIED, PAIN PRESENT: ICD-10-PCS | Mod: S$GLB,,, | Performed by: DERMATOLOGY

## 2021-04-13 RX ORDER — KETOCONAZOLE 20 MG/ML
SHAMPOO, SUSPENSION TOPICAL
Qty: 120 ML | Refills: 0 | Status: SHIPPED | OUTPATIENT
Start: 2021-04-15 | End: 2021-05-04

## 2021-04-13 RX ORDER — DOXYCYCLINE 100 MG/1
TABLET ORAL
Qty: 60 TABLET | Refills: 0 | Status: SHIPPED | OUTPATIENT
Start: 2021-04-13 | End: 2021-09-24

## 2021-04-14 LAB
GRAM STN SPEC: NORMAL
GRAM STN SPEC: NORMAL

## 2021-04-15 ENCOUNTER — CLINICAL SUPPORT (OUTPATIENT)
Dept: OTHER | Facility: CLINIC | Age: 31
End: 2021-04-15
Payer: COMMERCIAL

## 2021-04-15 ENCOUNTER — PATIENT MESSAGE (OUTPATIENT)
Dept: INFECTIOUS DISEASES | Facility: CLINIC | Age: 31
End: 2021-04-15

## 2021-04-15 DIAGNOSIS — Z00.8 ENCOUNTER FOR OTHER GENERAL EXAMINATION: ICD-10-CM

## 2021-04-16 ENCOUNTER — CLINICAL SUPPORT (OUTPATIENT)
Dept: URGENT CARE | Facility: CLINIC | Age: 31
End: 2021-04-16
Payer: COMMERCIAL

## 2021-04-16 VITALS — BODY MASS INDEX: 22.52 KG/M2 | HEIGHT: 64 IN

## 2021-04-16 DIAGNOSIS — Z20.822 ENCOUNTER FOR LABORATORY TESTING FOR COVID-19 VIRUS: ICD-10-CM

## 2021-04-16 LAB
BACTERIA SPEC AEROBE CULT: NORMAL
GLUCOSE SERPL-MCNC: 84 MG/DL (ref 60–140)
HDLC SERPL-MCNC: 71 MG/DL
POC CHOLESTEROL, LDL (DOCK): 89 MG/DL
POC CHOLESTEROL, TOTAL: 170 MG/DL
TRIGL SERPL-MCNC: 49 MG/DL

## 2021-04-16 PROCEDURE — 99211 PR OFFICE/OUTPT VISIT, EST, LEVL I: ICD-10-PCS | Mod: S$GLB,,, | Performed by: NURSE PRACTITIONER

## 2021-04-16 PROCEDURE — U0003 INFECTIOUS AGENT DETECTION BY NUCLEIC ACID (DNA OR RNA); SEVERE ACUTE RESPIRATORY SYNDROME CORONAVIRUS 2 (SARS-COV-2) (CORONAVIRUS DISEASE [COVID-19]), AMPLIFIED PROBE TECHNIQUE, MAKING USE OF HIGH THROUGHPUT TECHNOLOGIES AS DESCRIBED BY CMS-2020-01-R: HCPCS | Performed by: NURSE PRACTITIONER

## 2021-04-16 PROCEDURE — 99211 OFF/OP EST MAY X REQ PHY/QHP: CPT | Mod: S$GLB,,, | Performed by: NURSE PRACTITIONER

## 2021-04-18 ENCOUNTER — TELEPHONE (OUTPATIENT)
Dept: URGENT CARE | Facility: CLINIC | Age: 31
End: 2021-04-18

## 2021-04-19 LAB — SARS-COV-2 RNA RESP QL NAA+PROBE: NOT DETECTED

## 2021-04-20 ENCOUNTER — TELEPHONE (OUTPATIENT)
Dept: INFECTIOUS DISEASES | Facility: CLINIC | Age: 31
End: 2021-04-20

## 2021-05-11 LAB — FUNGUS SPEC CULT: NORMAL

## 2021-05-13 ENCOUNTER — PATIENT MESSAGE (OUTPATIENT)
Dept: INFECTIOUS DISEASES | Facility: CLINIC | Age: 31
End: 2021-05-13

## 2021-05-19 ENCOUNTER — TELEPHONE (OUTPATIENT)
Dept: INFECTIOUS DISEASES | Facility: CLINIC | Age: 31
End: 2021-05-19

## 2021-05-20 ENCOUNTER — OFFICE VISIT (OUTPATIENT)
Dept: INFECTIOUS DISEASES | Facility: CLINIC | Age: 31
End: 2021-05-20
Payer: COMMERCIAL

## 2021-05-20 VITALS
HEIGHT: 64 IN | WEIGHT: 132.19 LBS | SYSTOLIC BLOOD PRESSURE: 124 MMHG | HEART RATE: 59 BPM | DIASTOLIC BLOOD PRESSURE: 75 MMHG | BODY MASS INDEX: 22.57 KG/M2

## 2021-05-20 DIAGNOSIS — L03.811 CELLULITIS OF SCALP: Primary | ICD-10-CM

## 2021-05-20 DIAGNOSIS — R23.8 PAPULE: ICD-10-CM

## 2021-05-20 PROCEDURE — 99213 PR OFFICE/OUTPT VISIT, EST, LEVL III, 20-29 MIN: ICD-10-PCS | Mod: S$GLB,,, | Performed by: INTERNAL MEDICINE

## 2021-05-20 PROCEDURE — 3008F BODY MASS INDEX DOCD: CPT | Mod: CPTII,S$GLB,, | Performed by: INTERNAL MEDICINE

## 2021-05-20 PROCEDURE — 99999 PR PBB SHADOW E&M-EST. PATIENT-LVL III: ICD-10-PCS | Mod: PBBFAC,,, | Performed by: INTERNAL MEDICINE

## 2021-05-20 PROCEDURE — 3008F PR BODY MASS INDEX (BMI) DOCUMENTED: ICD-10-PCS | Mod: CPTII,S$GLB,, | Performed by: INTERNAL MEDICINE

## 2021-05-20 PROCEDURE — 1126F PR PAIN SEVERITY QUANTIFIED, NO PAIN PRESENT: ICD-10-PCS | Mod: S$GLB,,, | Performed by: INTERNAL MEDICINE

## 2021-05-20 PROCEDURE — 1126F AMNT PAIN NOTED NONE PRSNT: CPT | Mod: S$GLB,,, | Performed by: INTERNAL MEDICINE

## 2021-05-20 PROCEDURE — 99213 OFFICE O/P EST LOW 20 MIN: CPT | Mod: S$GLB,,, | Performed by: INTERNAL MEDICINE

## 2021-05-20 PROCEDURE — 99999 PR PBB SHADOW E&M-EST. PATIENT-LVL III: CPT | Mod: PBBFAC,,, | Performed by: INTERNAL MEDICINE

## 2021-05-21 ENCOUNTER — PATIENT MESSAGE (OUTPATIENT)
Dept: DERMATOLOGY | Facility: CLINIC | Age: 31
End: 2021-05-21

## 2021-05-24 ENCOUNTER — OFFICE VISIT (OUTPATIENT)
Dept: DERMATOLOGY | Facility: CLINIC | Age: 31
End: 2021-05-24
Payer: COMMERCIAL

## 2021-05-24 DIAGNOSIS — Z22.322 MRSA CARRIER: ICD-10-CM

## 2021-05-24 DIAGNOSIS — D22.4 MELANOCYTIC NEVUS OF SCALP: ICD-10-CM

## 2021-05-24 DIAGNOSIS — D48.5 NEOPLASM OF UNCERTAIN BEHAVIOR OF SKIN: Primary | ICD-10-CM

## 2021-05-24 PROCEDURE — 99213 OFFICE O/P EST LOW 20 MIN: CPT | Mod: 25,S$GLB,, | Performed by: DERMATOLOGY

## 2021-05-24 PROCEDURE — 88305 TISSUE EXAM BY PATHOLOGIST: CPT | Mod: 26,,, | Performed by: PATHOLOGY

## 2021-05-24 PROCEDURE — 1126F PR PAIN SEVERITY QUANTIFIED, NO PAIN PRESENT: ICD-10-PCS | Mod: S$GLB,,, | Performed by: DERMATOLOGY

## 2021-05-24 PROCEDURE — 11102 PR TANGENTIAL BIOPSY, SKIN, SINGLE LESION: ICD-10-PCS | Mod: S$GLB,,, | Performed by: DERMATOLOGY

## 2021-05-24 PROCEDURE — 88305 TISSUE EXAM BY PATHOLOGIST: CPT | Performed by: PATHOLOGY

## 2021-05-24 PROCEDURE — 88312 PR  SPECIAL STAINS,GROUP I: ICD-10-PCS | Mod: 26,,, | Performed by: PATHOLOGY

## 2021-05-24 PROCEDURE — 88312 SPECIAL STAINS GROUP 1: CPT | Performed by: PATHOLOGY

## 2021-05-24 PROCEDURE — 11102 TANGNTL BX SKIN SINGLE LES: CPT | Mod: S$GLB,,, | Performed by: DERMATOLOGY

## 2021-05-24 PROCEDURE — 99213 PR OFFICE/OUTPT VISIT, EST, LEVL III, 20-29 MIN: ICD-10-PCS | Mod: 25,S$GLB,, | Performed by: DERMATOLOGY

## 2021-05-24 PROCEDURE — 88305 TISSUE EXAM BY PATHOLOGIST: ICD-10-PCS | Mod: 26,,, | Performed by: PATHOLOGY

## 2021-05-24 PROCEDURE — 1126F AMNT PAIN NOTED NONE PRSNT: CPT | Mod: S$GLB,,, | Performed by: DERMATOLOGY

## 2021-05-24 PROCEDURE — 88312 SPECIAL STAINS GROUP 1: CPT | Mod: 26,,, | Performed by: PATHOLOGY

## 2021-06-03 LAB
FINAL PATHOLOGIC DIAGNOSIS: NORMAL
GROSS: NORMAL
Lab: NORMAL
MICROSCOPIC EXAM: NORMAL

## 2021-06-07 ENCOUNTER — PATIENT MESSAGE (OUTPATIENT)
Dept: DERMATOLOGY | Facility: CLINIC | Age: 31
End: 2021-06-07

## 2021-06-08 ENCOUNTER — PATIENT MESSAGE (OUTPATIENT)
Dept: INTERNAL MEDICINE | Facility: CLINIC | Age: 31
End: 2021-06-08

## 2021-06-08 ENCOUNTER — OFFICE VISIT (OUTPATIENT)
Dept: INTERNAL MEDICINE | Facility: CLINIC | Age: 31
End: 2021-06-08
Payer: COMMERCIAL

## 2021-06-08 VITALS
DIASTOLIC BLOOD PRESSURE: 78 MMHG | HEIGHT: 64 IN | HEART RATE: 93 BPM | BODY MASS INDEX: 22.06 KG/M2 | WEIGHT: 129.19 LBS | OXYGEN SATURATION: 100 % | SYSTOLIC BLOOD PRESSURE: 124 MMHG

## 2021-06-08 DIAGNOSIS — J02.9 PHARYNGITIS, UNSPECIFIED ETIOLOGY: Primary | ICD-10-CM

## 2021-06-08 DIAGNOSIS — J45.20 MILD INTERMITTENT ASTHMA WITHOUT COMPLICATION: ICD-10-CM

## 2021-06-08 LAB
CTP QC/QA: YES
S PYO RRNA THROAT QL PROBE: NEGATIVE

## 2021-06-08 PROCEDURE — 3008F PR BODY MASS INDEX (BMI) DOCUMENTED: ICD-10-PCS | Mod: CPTII,S$GLB,, | Performed by: INTERNAL MEDICINE

## 2021-06-08 PROCEDURE — 87081 CULTURE SCREEN ONLY: CPT | Performed by: INTERNAL MEDICINE

## 2021-06-08 PROCEDURE — 1125F PR PAIN SEVERITY QUANTIFIED, PAIN PRESENT: ICD-10-PCS | Mod: S$GLB,,, | Performed by: INTERNAL MEDICINE

## 2021-06-08 PROCEDURE — 87880 POCT RAPID STREP A: ICD-10-PCS | Mod: QW,S$GLB,, | Performed by: INTERNAL MEDICINE

## 2021-06-08 PROCEDURE — 87880 STREP A ASSAY W/OPTIC: CPT | Mod: QW,S$GLB,, | Performed by: INTERNAL MEDICINE

## 2021-06-08 PROCEDURE — 1125F AMNT PAIN NOTED PAIN PRSNT: CPT | Mod: S$GLB,,, | Performed by: INTERNAL MEDICINE

## 2021-06-08 PROCEDURE — 87147 CULTURE TYPE IMMUNOLOGIC: CPT | Performed by: INTERNAL MEDICINE

## 2021-06-08 PROCEDURE — 99999 PR PBB SHADOW E&M-EST. PATIENT-LVL III: CPT | Mod: PBBFAC,,, | Performed by: INTERNAL MEDICINE

## 2021-06-08 PROCEDURE — 99215 PR OFFICE/OUTPT VISIT, EST, LEVL V, 40-54 MIN: ICD-10-PCS | Mod: 25,S$GLB,, | Performed by: INTERNAL MEDICINE

## 2021-06-08 PROCEDURE — 3008F BODY MASS INDEX DOCD: CPT | Mod: CPTII,S$GLB,, | Performed by: INTERNAL MEDICINE

## 2021-06-08 PROCEDURE — U0005 INFEC AGEN DETEC AMPLI PROBE: HCPCS | Performed by: INTERNAL MEDICINE

## 2021-06-08 PROCEDURE — U0003 INFECTIOUS AGENT DETECTION BY NUCLEIC ACID (DNA OR RNA); SEVERE ACUTE RESPIRATORY SYNDROME CORONAVIRUS 2 (SARS-COV-2) (CORONAVIRUS DISEASE [COVID-19]), AMPLIFIED PROBE TECHNIQUE, MAKING USE OF HIGH THROUGHPUT TECHNOLOGIES AS DESCRIBED BY CMS-2020-01-R: HCPCS | Performed by: INTERNAL MEDICINE

## 2021-06-08 PROCEDURE — 99999 PR PBB SHADOW E&M-EST. PATIENT-LVL III: ICD-10-PCS | Mod: PBBFAC,,, | Performed by: INTERNAL MEDICINE

## 2021-06-08 PROCEDURE — 99215 OFFICE O/P EST HI 40 MIN: CPT | Mod: 25,S$GLB,, | Performed by: INTERNAL MEDICINE

## 2021-06-08 RX ORDER — PENICILLIN V POTASSIUM 500 MG/1
500 TABLET, FILM COATED ORAL 2 TIMES DAILY
Qty: 20 TABLET | Refills: 0 | Status: SHIPPED | OUTPATIENT
Start: 2021-06-08 | End: 2021-06-18

## 2021-06-09 ENCOUNTER — PATIENT MESSAGE (OUTPATIENT)
Dept: INTERNAL MEDICINE | Facility: CLINIC | Age: 31
End: 2021-06-09

## 2021-06-09 DIAGNOSIS — E53.8 CYANOCOBALAMIN DEFICIENCY: Primary | ICD-10-CM

## 2021-06-09 LAB — SARS-COV-2 RNA RESP QL NAA+PROBE: NOT DETECTED

## 2021-06-11 LAB
BACTERIA THROAT CULT: ABNORMAL
BACTERIA THROAT CULT: ABNORMAL

## 2021-06-15 ENCOUNTER — LAB VISIT (OUTPATIENT)
Dept: LAB | Facility: OTHER | Age: 31
End: 2021-06-15
Attending: INTERNAL MEDICINE
Payer: COMMERCIAL

## 2021-06-15 DIAGNOSIS — E53.8 CYANOCOBALAMIN DEFICIENCY: ICD-10-CM

## 2021-06-15 LAB
FERRITIN SERPL-MCNC: 23 NG/ML (ref 20–300)
FOLATE SERPL-MCNC: 12.7 NG/ML (ref 4–24)
IRON SERPL-MCNC: 104 UG/DL (ref 30–160)
SATURATED IRON: 28 % (ref 20–50)
TOTAL IRON BINDING CAPACITY: 374 UG/DL (ref 250–450)
TRANSFERRIN SERPL-MCNC: 253 MG/DL (ref 200–375)
VIT B12 SERPL-MCNC: 336 PG/ML (ref 210–950)

## 2021-06-15 PROCEDURE — 36415 COLL VENOUS BLD VENIPUNCTURE: CPT | Performed by: INTERNAL MEDICINE

## 2021-06-15 PROCEDURE — 83540 ASSAY OF IRON: CPT | Performed by: INTERNAL MEDICINE

## 2021-06-15 PROCEDURE — 82746 ASSAY OF FOLIC ACID SERUM: CPT | Performed by: INTERNAL MEDICINE

## 2021-06-15 PROCEDURE — 82607 VITAMIN B-12: CPT | Performed by: INTERNAL MEDICINE

## 2021-06-15 PROCEDURE — 82728 ASSAY OF FERRITIN: CPT | Performed by: INTERNAL MEDICINE

## 2021-09-23 ENCOUNTER — PATIENT MESSAGE (OUTPATIENT)
Dept: DERMATOLOGY | Facility: CLINIC | Age: 31
End: 2021-09-23

## 2021-09-24 ENCOUNTER — OFFICE VISIT (OUTPATIENT)
Dept: DERMATOLOGY | Facility: CLINIC | Age: 31
End: 2021-09-24
Payer: COMMERCIAL

## 2021-09-24 DIAGNOSIS — D48.5 NEOPLASM OF UNCERTAIN BEHAVIOR OF SKIN: Primary | ICD-10-CM

## 2021-09-24 DIAGNOSIS — L30.9 DERMATITIS: ICD-10-CM

## 2021-09-24 PROCEDURE — 1159F PR MEDICATION LIST DOCUMENTED IN MEDICAL RECORD: ICD-10-PCS | Mod: CPTII,S$GLB,, | Performed by: DERMATOLOGY

## 2021-09-24 PROCEDURE — 99214 PR OFFICE/OUTPT VISIT, EST, LEVL IV, 30-39 MIN: ICD-10-PCS | Mod: 25,S$GLB,, | Performed by: DERMATOLOGY

## 2021-09-24 PROCEDURE — 88305 TISSUE EXAM BY PATHOLOGIST: ICD-10-PCS | Mod: 26,,, | Performed by: PATHOLOGY

## 2021-09-24 PROCEDURE — 99214 OFFICE O/P EST MOD 30 MIN: CPT | Mod: 25,S$GLB,, | Performed by: DERMATOLOGY

## 2021-09-24 PROCEDURE — 88305 TISSUE EXAM BY PATHOLOGIST: CPT | Mod: 26,,, | Performed by: PATHOLOGY

## 2021-09-24 PROCEDURE — 3044F HG A1C LEVEL LT 7.0%: CPT | Mod: CPTII,S$GLB,, | Performed by: DERMATOLOGY

## 2021-09-24 PROCEDURE — 1160F PR REVIEW ALL MEDS BY PRESCRIBER/CLIN PHARMACIST DOCUMENTED: ICD-10-PCS | Mod: CPTII,S$GLB,, | Performed by: DERMATOLOGY

## 2021-09-24 PROCEDURE — 3044F PR MOST RECENT HEMOGLOBIN A1C LEVEL <7.0%: ICD-10-PCS | Mod: CPTII,S$GLB,, | Performed by: DERMATOLOGY

## 2021-09-24 PROCEDURE — 11104 PUNCH BX SKIN SINGLE LESION: CPT | Mod: S$GLB,,, | Performed by: DERMATOLOGY

## 2021-09-24 PROCEDURE — 1160F RVW MEDS BY RX/DR IN RCRD: CPT | Mod: CPTII,S$GLB,, | Performed by: DERMATOLOGY

## 2021-09-24 PROCEDURE — 11104 PR PUNCH BIOPSY, SKIN, SINGLE LESION: ICD-10-PCS | Mod: S$GLB,,, | Performed by: DERMATOLOGY

## 2021-09-24 PROCEDURE — 1159F MED LIST DOCD IN RCRD: CPT | Mod: CPTII,S$GLB,, | Performed by: DERMATOLOGY

## 2021-09-24 PROCEDURE — 88305 TISSUE EXAM BY PATHOLOGIST: CPT | Performed by: PATHOLOGY

## 2021-09-24 RX ORDER — TRIAMCINOLONE ACETONIDE 1 MG/G
CREAM TOPICAL
Qty: 454 G | Refills: 0 | Status: SHIPPED | OUTPATIENT
Start: 2021-09-24

## 2021-09-29 LAB
FINAL PATHOLOGIC DIAGNOSIS: NORMAL
GROSS: NORMAL
Lab: NORMAL
MICROSCOPIC EXAM: NORMAL

## 2021-10-06 ENCOUNTER — PATIENT MESSAGE (OUTPATIENT)
Dept: DERMATOLOGY | Facility: CLINIC | Age: 31
End: 2021-10-06

## 2021-10-07 ENCOUNTER — PATIENT OUTREACH (OUTPATIENT)
Dept: ADMINISTRATIVE | Facility: OTHER | Age: 31
End: 2021-10-07

## 2021-10-08 ENCOUNTER — OFFICE VISIT (OUTPATIENT)
Dept: DERMATOLOGY | Facility: CLINIC | Age: 31
End: 2021-10-08
Payer: COMMERCIAL

## 2021-10-08 DIAGNOSIS — M25.60 JOINT STIFFNESS: ICD-10-CM

## 2021-10-08 DIAGNOSIS — L02.421 FURUNCLE OF RIGHT AXILLA: ICD-10-CM

## 2021-10-08 DIAGNOSIS — R06.00 DYSPNEA, UNSPECIFIED TYPE: ICD-10-CM

## 2021-10-08 DIAGNOSIS — L40.4 GUTTATE PSORIASIS: Primary | ICD-10-CM

## 2021-10-08 DIAGNOSIS — Z22.322 MRSA CARRIER: ICD-10-CM

## 2021-10-08 DIAGNOSIS — R61 NIGHT SWEATS: ICD-10-CM

## 2021-10-08 DIAGNOSIS — R63.4 UNEXPLAINED WEIGHT LOSS: ICD-10-CM

## 2021-10-08 PROCEDURE — 1159F MED LIST DOCD IN RCRD: CPT | Mod: CPTII,S$GLB,, | Performed by: DERMATOLOGY

## 2021-10-08 PROCEDURE — 1159F PR MEDICATION LIST DOCUMENTED IN MEDICAL RECORD: ICD-10-PCS | Mod: CPTII,S$GLB,, | Performed by: DERMATOLOGY

## 2021-10-08 PROCEDURE — 3044F PR MOST RECENT HEMOGLOBIN A1C LEVEL <7.0%: ICD-10-PCS | Mod: CPTII,S$GLB,, | Performed by: DERMATOLOGY

## 2021-10-08 PROCEDURE — 1160F RVW MEDS BY RX/DR IN RCRD: CPT | Mod: CPTII,S$GLB,, | Performed by: DERMATOLOGY

## 2021-10-08 PROCEDURE — 1160F PR REVIEW ALL MEDS BY PRESCRIBER/CLIN PHARMACIST DOCUMENTED: ICD-10-PCS | Mod: CPTII,S$GLB,, | Performed by: DERMATOLOGY

## 2021-10-08 PROCEDURE — 99215 OFFICE O/P EST HI 40 MIN: CPT | Mod: S$GLB,,, | Performed by: DERMATOLOGY

## 2021-10-08 PROCEDURE — 3044F HG A1C LEVEL LT 7.0%: CPT | Mod: CPTII,S$GLB,, | Performed by: DERMATOLOGY

## 2021-10-08 PROCEDURE — 99215 PR OFFICE/OUTPT VISIT, EST, LEVL V, 40-54 MIN: ICD-10-PCS | Mod: S$GLB,,, | Performed by: DERMATOLOGY

## 2021-10-08 RX ORDER — DOXYCYCLINE 100 MG/1
TABLET ORAL
Qty: 20 TABLET | Refills: 0 | Status: SHIPPED | OUTPATIENT
Start: 2021-10-08

## 2021-10-21 ENCOUNTER — TELEPHONE (OUTPATIENT)
Dept: DERMATOLOGY | Facility: CLINIC | Age: 31
End: 2021-10-21

## 2021-10-22 ENCOUNTER — HOSPITAL ENCOUNTER (OUTPATIENT)
Dept: RADIOLOGY | Facility: OTHER | Age: 31
Discharge: HOME | End: 2021-10-22
Attending: DERMATOLOGY
Payer: COMMERCIAL

## 2021-10-22 DIAGNOSIS — R06.00 DYSPNEA, UNSPECIFIED TYPE: ICD-10-CM

## 2021-10-22 DIAGNOSIS — R63.4 UNEXPLAINED WEIGHT LOSS: ICD-10-CM

## 2021-10-22 PROCEDURE — 71046 X-RAY EXAM CHEST 2 VIEWS: CPT | Mod: TC,FY

## 2021-10-22 PROCEDURE — 71046 X-RAY EXAM CHEST 2 VIEWS: CPT | Mod: 26,,, | Performed by: RADIOLOGY

## 2021-10-22 PROCEDURE — 71046 XR CHEST PA AND LATERAL: ICD-10-PCS | Mod: 26,,, | Performed by: RADIOLOGY

## 2021-11-11 ENCOUNTER — OFFICE VISIT (OUTPATIENT)
Dept: INTERNAL MEDICINE | Facility: CLINIC | Age: 31
End: 2021-11-11
Payer: COMMERCIAL

## 2021-11-11 ENCOUNTER — IMMUNIZATION (OUTPATIENT)
Dept: INTERNAL MEDICINE | Facility: CLINIC | Age: 31
End: 2021-11-11
Payer: COMMERCIAL

## 2021-11-11 ENCOUNTER — LAB VISIT (OUTPATIENT)
Dept: LAB | Facility: OTHER | Age: 31
End: 2021-11-11
Attending: INTERNAL MEDICINE
Payer: COMMERCIAL

## 2021-11-11 VITALS
DIASTOLIC BLOOD PRESSURE: 75 MMHG | BODY MASS INDEX: 20.62 KG/M2 | OXYGEN SATURATION: 98 % | WEIGHT: 120.13 LBS | SYSTOLIC BLOOD PRESSURE: 119 MMHG | HEART RATE: 75 BPM

## 2021-11-11 DIAGNOSIS — R59.1 LAD (LYMPHADENOPATHY): ICD-10-CM

## 2021-11-11 DIAGNOSIS — R11.0 POSTPRANDIAL NAUSEA: ICD-10-CM

## 2021-11-11 DIAGNOSIS — R63.4 UNINTENTIONAL WEIGHT LOSS: Primary | ICD-10-CM

## 2021-11-11 DIAGNOSIS — Z23 HIGH PRIORITY FOR COVID-19 VACCINATION: ICD-10-CM

## 2021-11-11 DIAGNOSIS — R68.81 EARLY SATIETY: ICD-10-CM

## 2021-11-11 DIAGNOSIS — R63.4 UNINTENTIONAL WEIGHT LOSS: ICD-10-CM

## 2021-11-11 DIAGNOSIS — R61 NIGHT SWEATS: ICD-10-CM

## 2021-11-11 DIAGNOSIS — Z23 NEED FOR VACCINATION: Primary | ICD-10-CM

## 2021-11-11 LAB
ALBUMIN SERPL BCP-MCNC: 4.9 G/DL (ref 3.5–5.2)
ALP SERPL-CCNC: 51 U/L (ref 55–135)
ALT SERPL W/O P-5'-P-CCNC: 5 U/L (ref 10–44)
ANION GAP SERPL CALC-SCNC: 11 MMOL/L (ref 8–16)
AST SERPL-CCNC: 14 U/L (ref 10–40)
B-HCG UR QL: NEGATIVE
BASOPHILS # BLD AUTO: 0.08 K/UL (ref 0–0.2)
BASOPHILS NFR BLD: 0.9 % (ref 0–1.9)
BILIRUB SERPL-MCNC: 0.7 MG/DL (ref 0.1–1)
BILIRUB SERPL-MCNC: NORMAL MG/DL
BLOOD URINE, POC: 250
BUN SERPL-MCNC: 8 MG/DL (ref 6–20)
CALCIUM SERPL-MCNC: 9.8 MG/DL (ref 8.7–10.5)
CHLORIDE SERPL-SCNC: 105 MMOL/L (ref 95–110)
CO2 SERPL-SCNC: 22 MMOL/L (ref 23–29)
COLOR, POC UA: NORMAL
CREAT SERPL-MCNC: 0.9 MG/DL (ref 0.5–1.4)
CRP SERPL-MCNC: 0.4 MG/L (ref 0–8.2)
CTP QC/QA: YES
DIFFERENTIAL METHOD: NORMAL
EOSINOPHIL # BLD AUTO: 0.2 K/UL (ref 0–0.5)
EOSINOPHIL NFR BLD: 2.3 % (ref 0–8)
ERYTHROCYTE [DISTWIDTH] IN BLOOD BY AUTOMATED COUNT: 12.3 % (ref 11.5–14.5)
EST. GFR  (AFRICAN AMERICAN): >60 ML/MIN/1.73 M^2
EST. GFR  (NON AFRICAN AMERICAN): >60 ML/MIN/1.73 M^2
GLUCOSE SERPL-MCNC: 99 MG/DL (ref 70–110)
GLUCOSE UR QL STRIP: 50
HCT VFR BLD AUTO: 46.5 % (ref 37–48.5)
HGB BLD-MCNC: 15.2 G/DL (ref 12–16)
HIV 1+2 AB+HIV1 P24 AG SERPL QL IA: NEGATIVE
IMM GRANULOCYTES # BLD AUTO: 0.03 K/UL (ref 0–0.04)
IMM GRANULOCYTES NFR BLD AUTO: 0.3 % (ref 0–0.5)
KETONES UR QL STRIP: NORMAL
LEUKOCYTE ESTERASE URINE, POC: NORMAL
LYMPHOCYTES # BLD AUTO: 2.4 K/UL (ref 1–4.8)
LYMPHOCYTES NFR BLD: 27.5 % (ref 18–48)
MCH RBC QN AUTO: 30 PG (ref 27–31)
MCHC RBC AUTO-ENTMCNC: 32.7 G/DL (ref 32–36)
MCV RBC AUTO: 92 FL (ref 82–98)
MONOCYTES # BLD AUTO: 0.6 K/UL (ref 0.3–1)
MONOCYTES NFR BLD: 6.6 % (ref 4–15)
NEUTROPHILS # BLD AUTO: 5.4 K/UL (ref 1.8–7.7)
NEUTROPHILS NFR BLD: 62.4 % (ref 38–73)
NITRITE, POC UA: NORMAL
NRBC BLD-RTO: 0 /100 WBC
PH, POC UA: 6
PLATELET # BLD AUTO: 363 K/UL (ref 150–450)
PMV BLD AUTO: 10.1 FL (ref 9.2–12.9)
POTASSIUM SERPL-SCNC: 4 MMOL/L (ref 3.5–5.1)
PROT SERPL-MCNC: 8.9 G/DL (ref 6–8.4)
PROTEIN, POC: NORMAL
RBC # BLD AUTO: 5.07 M/UL (ref 4–5.4)
SODIUM SERPL-SCNC: 138 MMOL/L (ref 136–145)
SPECIFIC GRAVITY, POC UA: 1.02
TSH SERPL DL<=0.005 MIU/L-ACNC: 2.23 UIU/ML (ref 0.4–4)
UROBILINOGEN, POC UA: NORMAL
WBC # BLD AUTO: 8.7 K/UL (ref 3.9–12.7)

## 2021-11-11 PROCEDURE — 81001 POCT URINALYSIS, DIPSTICK OR TABLET REAGENT, AUTOMATED, WITH MICROSCOP: ICD-10-PCS | Mod: S$GLB,,, | Performed by: INTERNAL MEDICINE

## 2021-11-11 PROCEDURE — 87040 BLOOD CULTURE FOR BACTERIA: CPT | Performed by: INTERNAL MEDICINE

## 2021-11-11 PROCEDURE — 83516 IMMUNOASSAY NONANTIBODY: CPT | Performed by: INTERNAL MEDICINE

## 2021-11-11 PROCEDURE — 87389 HIV-1 AG W/HIV-1&-2 AB AG IA: CPT | Performed by: INTERNAL MEDICINE

## 2021-11-11 PROCEDURE — 99999 PR PBB SHADOW E&M-EST. PATIENT-LVL III: CPT | Mod: PBBFAC,,, | Performed by: INTERNAL MEDICINE

## 2021-11-11 PROCEDURE — 3008F BODY MASS INDEX DOCD: CPT | Mod: CPTII,S$GLB,, | Performed by: INTERNAL MEDICINE

## 2021-11-11 PROCEDURE — 99215 PR OFFICE/OUTPT VISIT, EST, LEVL V, 40-54 MIN: ICD-10-PCS | Mod: S$GLB,,, | Performed by: INTERNAL MEDICINE

## 2021-11-11 PROCEDURE — 80053 COMPREHEN METABOLIC PANEL: CPT | Performed by: INTERNAL MEDICINE

## 2021-11-11 PROCEDURE — 81025 POCT URINE PREGNANCY: ICD-10-PCS | Mod: S$GLB,,, | Performed by: INTERNAL MEDICINE

## 2021-11-11 PROCEDURE — 1159F PR MEDICATION LIST DOCUMENTED IN MEDICAL RECORD: ICD-10-PCS | Mod: CPTII,S$GLB,, | Performed by: INTERNAL MEDICINE

## 2021-11-11 PROCEDURE — 3078F PR MOST RECENT DIASTOLIC BLOOD PRESSURE < 80 MM HG: ICD-10-PCS | Mod: CPTII,S$GLB,, | Performed by: INTERNAL MEDICINE

## 2021-11-11 PROCEDURE — 1160F RVW MEDS BY RX/DR IN RCRD: CPT | Mod: CPTII,S$GLB,, | Performed by: INTERNAL MEDICINE

## 2021-11-11 PROCEDURE — 99999 PR PBB SHADOW E&M-EST. PATIENT-LVL III: ICD-10-PCS | Mod: PBBFAC,,, | Performed by: INTERNAL MEDICINE

## 2021-11-11 PROCEDURE — 81025 URINE PREGNANCY TEST: CPT | Mod: S$GLB,,, | Performed by: INTERNAL MEDICINE

## 2021-11-11 PROCEDURE — 1159F MED LIST DOCD IN RCRD: CPT | Mod: CPTII,S$GLB,, | Performed by: INTERNAL MEDICINE

## 2021-11-11 PROCEDURE — 0064A COVID-19, MRNA, LNP-S, PF, 100 MCG/0.25 ML DOSE VACCINE (MODERNA BOOSTER): CPT | Mod: PBBFAC | Performed by: INTERNAL MEDICINE

## 2021-11-11 PROCEDURE — 3044F HG A1C LEVEL LT 7.0%: CPT | Mod: CPTII,S$GLB,, | Performed by: INTERNAL MEDICINE

## 2021-11-11 PROCEDURE — 81001 URINALYSIS AUTO W/SCOPE: CPT | Mod: S$GLB,,, | Performed by: INTERNAL MEDICINE

## 2021-11-11 PROCEDURE — 86140 C-REACTIVE PROTEIN: CPT | Performed by: INTERNAL MEDICINE

## 2021-11-11 PROCEDURE — 3074F PR MOST RECENT SYSTOLIC BLOOD PRESSURE < 130 MM HG: ICD-10-PCS | Mod: CPTII,S$GLB,, | Performed by: INTERNAL MEDICINE

## 2021-11-11 PROCEDURE — 3008F PR BODY MASS INDEX (BMI) DOCUMENTED: ICD-10-PCS | Mod: CPTII,S$GLB,, | Performed by: INTERNAL MEDICINE

## 2021-11-11 PROCEDURE — 99215 OFFICE O/P EST HI 40 MIN: CPT | Mod: S$GLB,,, | Performed by: INTERNAL MEDICINE

## 2021-11-11 PROCEDURE — 1160F PR REVIEW ALL MEDS BY PRESCRIBER/CLIN PHARMACIST DOCUMENTED: ICD-10-PCS | Mod: CPTII,S$GLB,, | Performed by: INTERNAL MEDICINE

## 2021-11-11 PROCEDURE — 84443 ASSAY THYROID STIM HORMONE: CPT | Performed by: INTERNAL MEDICINE

## 2021-11-11 PROCEDURE — 3078F DIAST BP <80 MM HG: CPT | Mod: CPTII,S$GLB,, | Performed by: INTERNAL MEDICINE

## 2021-11-11 PROCEDURE — 3044F PR MOST RECENT HEMOGLOBIN A1C LEVEL <7.0%: ICD-10-PCS | Mod: CPTII,S$GLB,, | Performed by: INTERNAL MEDICINE

## 2021-11-11 PROCEDURE — 3074F SYST BP LT 130 MM HG: CPT | Mod: CPTII,S$GLB,, | Performed by: INTERNAL MEDICINE

## 2021-11-11 PROCEDURE — 85025 COMPLETE CBC W/AUTO DIFF WBC: CPT | Performed by: INTERNAL MEDICINE

## 2021-11-12 ENCOUNTER — LAB VISIT (OUTPATIENT)
Dept: LAB | Facility: OTHER | Age: 31
End: 2021-11-12
Attending: INTERNAL MEDICINE
Payer: COMMERCIAL

## 2021-11-12 DIAGNOSIS — R63.4 UNINTENTIONAL WEIGHT LOSS: ICD-10-CM

## 2021-11-12 DIAGNOSIS — R68.81 EARLY SATIETY: ICD-10-CM

## 2021-11-12 DIAGNOSIS — R11.0 POSTPRANDIAL NAUSEA: ICD-10-CM

## 2021-11-12 PROCEDURE — 87338 HPYLORI STOOL AG IA: CPT | Performed by: INTERNAL MEDICINE

## 2021-11-15 LAB
TTG IGA SER-ACNC: 7 UNITS
TTG IGG SER-ACNC: 2 UNITS

## 2021-11-16 LAB
BACTERIA BLD CULT: NORMAL
BACTERIA BLD CULT: NORMAL

## 2021-11-17 ENCOUNTER — PATIENT OUTREACH (OUTPATIENT)
Dept: ADMINISTRATIVE | Facility: OTHER | Age: 31
End: 2021-11-17
Payer: COMMERCIAL

## 2021-11-17 LAB
H PYLORI AG STL QL IA: NORMAL
SPECIMEN SOURCE: NORMAL

## 2021-11-18 ENCOUNTER — TELEPHONE (OUTPATIENT)
Dept: OBSTETRICS AND GYNECOLOGY | Facility: CLINIC | Age: 31
End: 2021-11-18
Payer: COMMERCIAL

## 2021-12-02 ENCOUNTER — OFFICE VISIT (OUTPATIENT)
Dept: OBSTETRICS AND GYNECOLOGY | Facility: CLINIC | Age: 31
End: 2021-12-02
Payer: COMMERCIAL

## 2021-12-02 VITALS
BODY MASS INDEX: 21.64 KG/M2 | WEIGHT: 126.75 LBS | HEIGHT: 64 IN | SYSTOLIC BLOOD PRESSURE: 122 MMHG | DIASTOLIC BLOOD PRESSURE: 78 MMHG

## 2021-12-02 DIAGNOSIS — Z01.419 ENCOUNTER FOR ANNUAL ROUTINE GYNECOLOGICAL EXAMINATION: Primary | ICD-10-CM

## 2021-12-02 DIAGNOSIS — Z11.3 SCREEN FOR STD (SEXUALLY TRANSMITTED DISEASE): ICD-10-CM

## 2021-12-02 PROCEDURE — 87491 CHLMYD TRACH DNA AMP PROBE: CPT | Performed by: OBSTETRICS & GYNECOLOGY

## 2021-12-02 PROCEDURE — 87591 N.GONORRHOEAE DNA AMP PROB: CPT | Performed by: OBSTETRICS & GYNECOLOGY

## 2021-12-02 PROCEDURE — 99395 PR PREVENTIVE VISIT,EST,18-39: ICD-10-PCS | Mod: S$GLB,,, | Performed by: OBSTETRICS & GYNECOLOGY

## 2021-12-02 PROCEDURE — 87481 CANDIDA DNA AMP PROBE: CPT | Mod: 59 | Performed by: OBSTETRICS & GYNECOLOGY

## 2021-12-02 PROCEDURE — 99999 PR PBB SHADOW E&M-EST. PATIENT-LVL III: CPT | Mod: PBBFAC,,, | Performed by: OBSTETRICS & GYNECOLOGY

## 2021-12-02 PROCEDURE — 99999 PR PBB SHADOW E&M-EST. PATIENT-LVL III: ICD-10-PCS | Mod: PBBFAC,,, | Performed by: OBSTETRICS & GYNECOLOGY

## 2021-12-02 PROCEDURE — 99395 PREV VISIT EST AGE 18-39: CPT | Mod: S$GLB,,, | Performed by: OBSTETRICS & GYNECOLOGY

## 2021-12-06 LAB
BACTERIAL VAGINOSIS DNA: NEGATIVE
C TRACH DNA SPEC QL NAA+PROBE: NOT DETECTED
CANDIDA GLABRATA DNA: NEGATIVE
CANDIDA KRUSEI DNA: NEGATIVE
CANDIDA RRNA VAG QL PROBE: NEGATIVE
N GONORRHOEA DNA SPEC QL NAA+PROBE: NOT DETECTED
T VAGINALIS RRNA GENITAL QL PROBE: NEGATIVE

## 2022-01-24 ENCOUNTER — HOSPITAL ENCOUNTER (OUTPATIENT)
Dept: RADIOLOGY | Facility: OTHER | Age: 32
Discharge: HOME | End: 2022-01-24
Attending: INTERNAL MEDICINE
Payer: COMMERCIAL

## 2022-01-24 DIAGNOSIS — R61 NIGHT SWEATS: ICD-10-CM

## 2022-01-24 DIAGNOSIS — R59.1 LAD (LYMPHADENOPATHY): ICD-10-CM

## 2022-01-24 DIAGNOSIS — R63.4 UNINTENTIONAL WEIGHT LOSS: ICD-10-CM

## 2022-01-24 PROCEDURE — 76536 US SOFT TISSUE HEAD NECK THYROID: ICD-10-PCS | Mod: 26,,, | Performed by: RADIOLOGY

## 2022-01-24 PROCEDURE — 76536 US EXAM OF HEAD AND NECK: CPT | Mod: TC

## 2022-01-24 PROCEDURE — 76536 US EXAM OF HEAD AND NECK: CPT | Mod: 26,,, | Performed by: RADIOLOGY

## 2022-02-10 ENCOUNTER — TELEPHONE (OUTPATIENT)
Dept: RHEUMATOLOGY | Facility: CLINIC | Age: 32
End: 2022-02-10
Payer: COMMERCIAL

## 2022-02-10 ENCOUNTER — CLINICAL SUPPORT (OUTPATIENT)
Dept: OTHER | Facility: CLINIC | Age: 32
End: 2022-02-10
Payer: COMMERCIAL

## 2022-02-10 ENCOUNTER — PATIENT MESSAGE (OUTPATIENT)
Dept: RHEUMATOLOGY | Facility: CLINIC | Age: 32
End: 2022-02-10
Payer: COMMERCIAL

## 2022-02-10 DIAGNOSIS — Z00.8 ENCOUNTER FOR OTHER GENERAL EXAMINATION: ICD-10-CM

## 2022-02-11 ENCOUNTER — OFFICE VISIT (OUTPATIENT)
Dept: INTERNAL MEDICINE | Facility: CLINIC | Age: 32
End: 2022-02-11
Payer: COMMERCIAL

## 2022-02-11 VITALS
HEIGHT: 61 IN | BODY MASS INDEX: 24.39 KG/M2 | OXYGEN SATURATION: 98 % | HEART RATE: 66 BPM | WEIGHT: 129.19 LBS | DIASTOLIC BLOOD PRESSURE: 60 MMHG | SYSTOLIC BLOOD PRESSURE: 118 MMHG

## 2022-02-11 VITALS — HEIGHT: 60 IN | BODY MASS INDEX: 24.76 KG/M2

## 2022-02-11 DIAGNOSIS — F43.9 STRESS: Primary | ICD-10-CM

## 2022-02-11 LAB
GLUCOSE SERPL-MCNC: 83 MG/DL (ref 60–140)
HDLC SERPL-MCNC: 86 MG/DL
POC CHOLESTEROL, LDL (DOCK): 146 MG/DL
POC CHOLESTEROL, TOTAL: 242 MG/DL
TRIGL SERPL-MCNC: 47 MG/DL

## 2022-02-11 PROCEDURE — 3074F SYST BP LT 130 MM HG: CPT | Mod: CPTII,S$GLB,, | Performed by: INTERNAL MEDICINE

## 2022-02-11 PROCEDURE — 3078F DIAST BP <80 MM HG: CPT | Mod: CPTII,S$GLB,, | Performed by: INTERNAL MEDICINE

## 2022-02-11 PROCEDURE — 99999 PR PBB SHADOW E&M-EST. PATIENT-LVL III: ICD-10-PCS | Mod: PBBFAC,,, | Performed by: INTERNAL MEDICINE

## 2022-02-11 PROCEDURE — 3074F PR MOST RECENT SYSTOLIC BLOOD PRESSURE < 130 MM HG: ICD-10-PCS | Mod: CPTII,S$GLB,, | Performed by: INTERNAL MEDICINE

## 2022-02-11 PROCEDURE — 99999 PR PBB SHADOW E&M-EST. PATIENT-LVL III: CPT | Mod: PBBFAC,,, | Performed by: INTERNAL MEDICINE

## 2022-02-11 PROCEDURE — 1159F MED LIST DOCD IN RCRD: CPT | Mod: CPTII,S$GLB,, | Performed by: INTERNAL MEDICINE

## 2022-02-11 PROCEDURE — 3008F PR BODY MASS INDEX (BMI) DOCUMENTED: ICD-10-PCS | Mod: CPTII,S$GLB,, | Performed by: INTERNAL MEDICINE

## 2022-02-11 PROCEDURE — 3078F PR MOST RECENT DIASTOLIC BLOOD PRESSURE < 80 MM HG: ICD-10-PCS | Mod: CPTII,S$GLB,, | Performed by: INTERNAL MEDICINE

## 2022-02-11 PROCEDURE — 1160F PR REVIEW ALL MEDS BY PRESCRIBER/CLIN PHARMACIST DOCUMENTED: ICD-10-PCS | Mod: CPTII,S$GLB,, | Performed by: INTERNAL MEDICINE

## 2022-02-11 PROCEDURE — 99215 OFFICE O/P EST HI 40 MIN: CPT | Mod: S$GLB,,, | Performed by: INTERNAL MEDICINE

## 2022-02-11 PROCEDURE — 1159F PR MEDICATION LIST DOCUMENTED IN MEDICAL RECORD: ICD-10-PCS | Mod: CPTII,S$GLB,, | Performed by: INTERNAL MEDICINE

## 2022-02-11 PROCEDURE — 99215 PR OFFICE/OUTPT VISIT, EST, LEVL V, 40-54 MIN: ICD-10-PCS | Mod: S$GLB,,, | Performed by: INTERNAL MEDICINE

## 2022-02-11 PROCEDURE — 3008F BODY MASS INDEX DOCD: CPT | Mod: CPTII,S$GLB,, | Performed by: INTERNAL MEDICINE

## 2022-02-11 PROCEDURE — 1160F RVW MEDS BY RX/DR IN RCRD: CPT | Mod: CPTII,S$GLB,, | Performed by: INTERNAL MEDICINE

## 2022-02-11 RX ORDER — SERTRALINE HYDROCHLORIDE 25 MG/1
25 TABLET, FILM COATED ORAL DAILY
Qty: 30 TABLET | Refills: 11 | Status: SHIPPED | OUTPATIENT
Start: 2022-02-11 | End: 2022-03-23

## 2022-02-11 NOTE — PROGRESS NOTES
Subjective:       Patient ID: Sherrie Bravo is a 31 y.o. female who  has a past medical history of Allergy, Allergy to animals (7/14/2020), Asthma, Family history of melanoma (7/14/2020), Hand fracture, right, closed, initial encounter (1/1/2020), and Malaria (2010).    Chief Complaint: Weight Loss and Mental Health Problem     History was obtained from the patient and supplemented through chart review  -Saw OBGYN for well-woman exam.    Works here in [employer].  Has family in [place].     HPI    Stress:  Denies depressed mood, anxiety.  Immense stress, pressure at work. Has contributed to weight loss.  Increased stress d/t increased work load at the beginning of the year; Francisco is also joining the [employer] system. Feels distracted when working from home. Sx as below occurs nearly daily.  Is amenable to starting a med for mood, but is concerned that meds will slow her down at work.    Started talking to an OSH therapist 10/2021, which helped with anxiety. Still with significant stress at work.  Unsure of meds that her family has been on in the past.    No strong association with her menstrual cycle.    Sleep:  6 hours at night. Usually no more than 7 hours. No issues. Wakes up at 3 AM thinking of work.  Anhedonia:  Ok. Difficult d/t the pandemic.  Guilt: feels guilty about not finishing her work  Energy: decreased  Concentration: decreased.  Work is half completed. Distracted araceli when working at home.  Appetite: ok   Psychomotor agitation: fidgety and sometimes dragging  SI: no              Not addressed today.  Unintentional weight loss:  X about 1 year.  Was 150, 140 lbs in 7/2020. Initially thought it was from the pandemic, stress from work, but continues to lose weight.  Her work is still very stressful.  She works very long days, 7 days/week.  Has lost about 10 lb since .    She has been eating more. Stopped working out (squats, pushups, yoga, walk) since 3/2021.  Has been walking,  although not vigorously.  Feels a little more fatigued from exercise.    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  Derm considering starting meds that may cause wt loss and rec eval by PCP first.  Has cervical, submental LAD x 1 year.  The submental LAD vary in size.  The left cervical and right occipital LAD are small and unchanged in size.    Thyroid U/S to eval for LAD c diffuse heterogenous thyroid gland, but no nodules, LAD. TSH wnl.    No diarrhea other than from triggers such as coffee, [place] food.  No change in stools. Normal appetite but has early satiety (usually does not have time for a full meal and eats small meals since she is on the go).  No dysphagia, odynophagia. No GERD sx. Has nausea after eating.    Birth control: Condoms, IUD. Regular menstrual cycles.      No fever. Was having dripping night sweats, hot flashes, usually occurring in the morning.  Resolved. No cough, abd/pelvic pain, dysuria.  +fatigue  She has had recurrent skin infections/staph.  CXR without acute abnormality.  Not on any offending medications.    UTD on Pap smear  s e/o malignancy.  Repeating co testing in 5 years.  FHx Mom with melanoma, MGM uterine cancer, PGF colon cancer.    +hair loss, thinning. Cold intolerance. Occasional palpitations.   Unclear etiology. CBC, CMP, TSH, CXR WNL. HIV NR. TTG neg.  Quantiferon gold neg. H pylori neg. Rec GI eval as below.  Lab Results   Component Value Date    TSH 2.229 11/11/2021     BMI Readings from Last 3 Encounters:   02/11/22 24.41 kg/m²   02/10/22 24.76 kg/m²   12/02/21 21.76 kg/m²     Night sweats:  Resolved. W/u as above. HIV, CRP, UA, TSH neg. BCx x 2 NG. QuantiFERON gold neg.     Early satiety:  H pylori neg. Recommend GI eval for EGD.  UPT negative.    Postprandial nausea:  Workup as above. UPT neg.    LAD:  Thyroid U/S to eval for LAD c diffuse heterogenous thyroid gland, but no nodules, LAD. TSH wnl.    AR, mild intermittent asthma:    Exposure to  pets. Weed pollen. Saw allergy.  Recommended antihistamine p.r.n.    Used to occur more often she's active, but chronic mild intermittent cough, SOB, wheezing, chest tightness. worsened x 1.5 years. Occurs daily.  Started around the time of her recurrent skin infx.  Is not very bothersome, so rarely uses her inhaler.  Currently no rhinorrhea, postnasal drip, itchy watery eyes. No nocturnal awakenings.  No GERD sx.  CBC w/o eosinophils.  Chronic, persistent. No AR, GERD sx. Will reassess, consider PFTs, low dose ICS/LABA.    H/o cyanocobalamin deficiency:  >10 years ago, had SOB, weight loss, arrhythmia, illness.  Had extreme B deficiency and was on daily B shots.  No change in diet.  Pescartarian.      No anemia. Normal MCV. Iron, B12, folate all normal.  Ferritin borderline low.  TIBC WNL.  Okay to start iron if she prefers.  Lab Results   Component Value Date    IRON 104 06/15/2021    TIBC 374 06/15/2021    FERRITIN 23 06/15/2021     Lab Results   Component Value Date    [id number] 336 06/15/2021     Lab Results   Component Value Date    FOLATE 12.7 06/15/2021     Guttate psoriasis:  Family history of melanoma.  Following with [facility].  Confirmed on biopsy.  On topical steroids.    H/o Recurrent infections:  H/o buttock abscess  status post drainage.  Completed Bactrim, Keflex for Staph aureus.  Has Doxy. Dermatology recommended bathing with Hibiclens.     Follows with Derm, Infectious Disease for scalp nodules, ?cellulitis.  Treated with doxycycline, but would recur.  Not consistent with abscess, but inflammatory.  Thought to have secondary infection.  Biopsy c/w lichen simplex chronicus/ prurigo nodularis. No longer with scalp issues. Follows with Derm.    Family hx of Vitiligo:  +Dad   Lab Results   Component Value Date    TSH 2.229 11/11/2021     Lab Results   Component Value Date/Time    HGBA1C 4.7 01/15/2021 10:33 AM     Review of Systems   Constitutional: Negative for activity change.  "  Musculoskeletal: Negative for gait problem.   Psychiatric/Behavioral: Positive for decreased concentration. Negative for confusion, dysphoric mood, sleep disturbance and suicidal ideas. The patient is not nervous/anxious.        I personally reviewed Past Medical History, Past Surgical History, Social History, and Family History.    Objective:      Vitals:    02/11/22 0831   BP: 118/60   Pulse: 66   SpO2: 98%   Weight: 58.6 kg (129 lb 3 oz)   Height: 5' 1" (1.549 m)      Physical Exam  Constitutional:       General: She is not in acute distress.     Appearance: She is well-developed and well-nourished. She is not diaphoretic.   HENT:      Head: Normocephalic and atraumatic.   Eyes:      General:         Right eye: No discharge.         Left eye: No discharge.   Pulmonary:      Effort: Pulmonary effort is normal. No respiratory distress.      Comments: Speaking in complete sentences.  Skin:     Coloration: Skin is not pale.      Findings: No erythema.   Neurological:      Mental Status: She is alert.   Psychiatric:         Mood and Affect: Mood is anxious. Mood is not depressed.         Behavior: Behavior normal.      Comments: Fidgety            Lab Results   Component Value Date    WBC 8.70 11/11/2021    HGB 15.2 11/11/2021    HCT 46.5 11/11/2021     11/11/2021    ALT 5 (L) 11/11/2021    AST 14 11/11/2021     11/11/2021    K 4.0 11/11/2021     11/11/2021    CREATININE 0.9 11/11/2021    BUN 8 11/11/2021    CO2 22 (L) 11/11/2021    TSH 2.229 11/11/2021    HGBA1C 4.7 01/15/2021       The ASCVD Risk score (Terry DC Jr., et al., 2013) failed to calculate for the following reasons:    The 2013 ASCVD risk score is only valid for ages 40 to 79    (Imaging have been independently reviewed)  Thyroid U/S to eval for LAD c diffuse heterogenous thyroid gland, but no nodules, LAD.    Assessment:       1. Stress          Plan:       Sherrie was seen today for weight loss and mental health problem.    Diagnoses " and all orders for this visit:    Stress  Comments:  Has sx of mild BRYANNA, dysthymia. Cont therapy. Discussed tx options. Start low dose Zoloft 25; discussed SE. RTC 1 mo for titration.   Orders:  -     sertraline (ZOLOFT) 25 MG tablet; Take 1 tablet (25 mg total) by mouth once daily.         Side effects of medication(s) were discussed in detail and patient voiced understanding.  Patient will call back for any issues or complications.     I have spent a total of 50 minutes with the patient as well as reviewing the chart/medical record and placing orders on the day of the visit. Discussed mental health.     RTC in 1 months or sooner PRN for weight loss, mood. In-person or virtual, but 30 minutes long.

## 2022-02-25 ENCOUNTER — TELEPHONE (OUTPATIENT)
Dept: RHEUMATOLOGY | Facility: CLINIC | Age: 32
End: 2022-02-25
Payer: COMMERCIAL

## 2022-02-28 ENCOUNTER — TELEPHONE (OUTPATIENT)
Dept: RHEUMATOLOGY | Facility: CLINIC | Age: 32
End: 2022-02-28
Payer: COMMERCIAL

## 2022-02-28 NOTE — TELEPHONE ENCOUNTER
I have called the pt  Twice today and left a message about her appointment being cx on 3/17 and rescheduled to 3/3

## 2022-03-23 ENCOUNTER — OFFICE VISIT (OUTPATIENT)
Dept: INTERNAL MEDICINE | Facility: CLINIC | Age: 32
End: 2022-03-23
Payer: COMMERCIAL

## 2022-03-23 DIAGNOSIS — F43.9 STRESS: Primary | ICD-10-CM

## 2022-03-23 PROCEDURE — 1159F MED LIST DOCD IN RCRD: CPT | Mod: CPTII,95,, | Performed by: INTERNAL MEDICINE

## 2022-03-23 PROCEDURE — 1159F PR MEDICATION LIST DOCUMENTED IN MEDICAL RECORD: ICD-10-PCS | Mod: CPTII,95,, | Performed by: INTERNAL MEDICINE

## 2022-03-23 PROCEDURE — 99214 OFFICE O/P EST MOD 30 MIN: CPT | Mod: 95,,, | Performed by: INTERNAL MEDICINE

## 2022-03-23 PROCEDURE — 1160F PR REVIEW ALL MEDS BY PRESCRIBER/CLIN PHARMACIST DOCUMENTED: ICD-10-PCS | Mod: CPTII,95,, | Performed by: INTERNAL MEDICINE

## 2022-03-23 PROCEDURE — 99214 PR OFFICE/OUTPT VISIT, EST, LEVL IV, 30-39 MIN: ICD-10-PCS | Mod: 95,,, | Performed by: INTERNAL MEDICINE

## 2022-03-23 PROCEDURE — 1160F RVW MEDS BY RX/DR IN RCRD: CPT | Mod: CPTII,95,, | Performed by: INTERNAL MEDICINE

## 2022-03-23 RX ORDER — SERTRALINE HYDROCHLORIDE 25 MG/1
25 TABLET, FILM COATED ORAL DAILY
Qty: 30 TABLET | Refills: 11 | Status: CANCELLED | OUTPATIENT
Start: 2022-03-23 | End: 2023-03-23

## 2022-03-23 RX ORDER — BUPROPION HYDROCHLORIDE 150 MG/1
150 TABLET ORAL DAILY
Qty: 30 TABLET | Refills: 3 | Status: SHIPPED | OUTPATIENT
Start: 2022-03-23 | End: 2022-05-18 | Stop reason: SDUPTHER

## 2022-03-23 NOTE — PROGRESS NOTES
"The patient location is: [place]  The chief complaint leading to consultation is: Stress (F/u; pt stopped taking zoloft)    Visit type: Virtual visit with synchronous audio and video  Contact time spent with patient: 20 minutes  Each patient to whom he or she provides medical services by telemedicine is:  (1) informed of the relationship between the physician and patient and the respective role of any other health care provider with respect to management of the patient; and (2) notified that he or she may decline to receive medical services by telemedicine and may withdraw from such care at any time.    Subjective:       Patient ID: Sherrie Bravo is a 31 y.o. female who  has a past medical history of Allergy, Allergy to animals (7/14/2020), Asthma, Family history of melanoma (7/14/2020), Hand fracture, right, closed, initial encounter (1/1/2020), and Malaria (2010).    Chief Complaint: Stress (F/u; pt stopped taking zoloft)     History was obtained from the patient and supplemented through chart review  There were no ER or clinic visits since our last appointment.    Works here in [employer].  Has family in [place].     HPI    Stress:  Denies depressed mood, anxiety.  Immense stress, pressure at work. Has contributed to weight loss.  Increased stress d/t increased work load at the beginning of the year; Francisco is also joining the [employer] system. Feels distracted when working from home. Sx as below occurs nearly daily.  Is amenable to starting a med for mood, but is concerned that meds will slow her down at work.  No strong association with her menstrual cycle.     Started talking to an OSH therapist 10/2021, which helped with anxiety.   Unsure of meds that her family has been on in the past.    Started Zoloft 25, but felt like it decreased motivation, "didn't care". Her house is a mess since she has trouble with concentration. Maybe some emotional blunting.   Had increased fatigue, but also difficulty " staying asleep.  Took it for a full month without improvement.  Has been off for a week and motivation is returning.     Used to walk to help with anxiety, but hasn't done so lately.    Sleep:  6 hours at night. Usually no more than 7 hours. No issues. Wakes up at 3 AM thinking of work.  Anhedonia:  Ok. Difficult d/t the pandemic.  Guilt: feels guilty about not finishing her work  Energy: decreased  Concentration: decreased.  Work is half completed. Hard to finish tasks.  Distracted araceli when working at home.  Appetite: ok   Psychomotor agitation: fidgety and sometimes dragging  SI: no              Not addressed today.  Unintentional weight loss:  X about 1 year.  Was 150, 140 lbs in 7/2020. Initially thought it was from the pandemic, stress from work, but continues to lose weight.  Her work is still very stressful.  She works very long days, 7 days/week.  Has lost about 10 lb since .    She has been eating more. Stopped working out (squats, pushups, yoga, walk) since 3/2021.  Has been walking, although not vigorously.  Feels a little more fatigued from exercise.    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  Derm considering starting meds that may cause wt loss and rec eval by PCP first.  Has cervical, submental LAD x 1 year.  The submental LAD vary in size.  The left cervical and right occipital LAD are small and unchanged in size.    Thyroid U/S to eval for LAD c diffuse heterogenous thyroid gland, but no nodules, LAD. TSH wnl.    No diarrhea other than from triggers such as coffee,  food.  No change in stools. Normal appetite but has early satiety (usually does not have time for a full meal and eats small meals since she is on the go).  No dysphagia, odynophagia. No GERD sx. Has nausea after eating.    Birth control: Condoms, IUD. Regular menstrual cycles.      No fever. Was having dripping night sweats, hot flashes, usually occurring in the morning.  Resolved. No cough, abd/pelvic  pain, dysuria.  +fatigue  She has had recurrent skin infections/staph.  CXR without acute abnormality.  Not on any offending medications.    UTD on Pap smear  s e/o malignancy.  Repeating co testing in 5 years.  FHx Mom with melanoma, MGM uterine cancer, PGF colon cancer.    +hair loss, thinning. Cold intolerance. Occasional palpitations.   Unclear etiology. CBC, CMP, TSH, CXR WNL. HIV NR. TTG neg.  Quantiferon gold neg. H pylori neg. Rec GI eval as below.  Lab Results   Component Value Date    TSH 2.229 11/11/2021     BMI Readings from Last 3 Encounters:   02/11/22 24.41 kg/m²   02/10/22 24.76 kg/m²   12/02/21 21.76 kg/m²     Night sweats:  Resolved. W/u as above. HIV, CRP, UA, TSH neg. BCx x 2 NG. QuantiFERON gold neg.     Early satiety:  H pylori neg. Recommend GI eval for EGD.  UPT negative.    Postprandial nausea:  Workup as above. UPT neg.    LAD:  Thyroid U/S to eval for LAD c diffuse heterogenous thyroid gland, but no nodules, LAD. TSH wnl.    AR, mild intermittent asthma:    Exposure to pets. Weed pollen. Saw allergy.  Recommended antihistamine p.r.n.    Used to occur more often she's active, but chronic mild intermittent cough, SOB, wheezing, chest tightness. worsened x 1.5 years. Occurs daily.  Started around the time of her recurrent skin infx.  Is not very bothersome, so rarely uses her inhaler.  Currently no rhinorrhea, postnasal drip, itchy watery eyes. No nocturnal awakenings.  No GERD sx.  CBC w/o eosinophils.  Chronic, persistent. No AR, GERD sx. Will reassess, consider PFTs, low dose ICS/LABA.    H/o cyanocobalamin deficiency:  >10 years ago, had SOB, weight loss, arrhythmia, illness.  Had extreme B deficiency and was on daily B shots.  No change in diet.  Pescartarian.      No anemia. Normal MCV. Iron, B12, folate all normal.  Ferritin borderline low.  TIBC WNL.  Okay to start iron if she prefers.  Lab Results   Component Value Date    IRON 104 06/15/2021    TIBC 374 06/15/2021    FERRITIN  23 06/15/2021     Lab Results   Component Value Date    [id number] 336 06/15/2021     Lab Results   Component Value Date    FOLATE 12.7 06/15/2021     Guttate psoriasis:  Family history of melanoma.  Following with [facility].  Confirmed on biopsy.  On topical steroids.    H/o Recurrent infections:  H/o buttock abscess  status post drainage.  Completed Bactrim, Keflex for Staph aureus.  Has Doxy. Dermatology recommended bathing with Hibiclens.     Follows with Derm, Infectious Disease for scalp nodules, ?cellulitis.  Treated with doxycycline, but would recur.  Not consistent with abscess, but inflammatory.  Thought to have secondary infection.  Biopsy c/w lichen simplex chronicus/ prurigo nodularis. No longer with scalp issues. Follows with Derm.    Family hx of Vitiligo:  +Dad   Lab Results   Component Value Date    TSH 2.229 11/11/2021     Lab Results   Component Value Date/Time    HGBA1C 4.7 01/15/2021 10:33 AM     Review of Systems   Constitutional: Positive for activity change. Negative for unexpected weight change.   HENT: Negative for hearing loss, rhinorrhea and trouble swallowing.    Eyes: Negative for discharge and visual disturbance.   Respiratory: Negative for chest tightness and wheezing.    Cardiovascular: Negative for chest pain and palpitations.   Gastrointestinal: Negative for blood in stool, constipation, diarrhea and vomiting.   Endocrine: Negative for polydipsia and polyuria.   Genitourinary: Negative for difficulty urinating, dysuria, hematuria and menstrual problem.   Musculoskeletal: Positive for arthralgias. Negative for joint swelling and neck pain.   Neurological: Positive for headaches. Negative for weakness.   Psychiatric/Behavioral: Positive for confusion. Negative for dysphoric mood.       I personally reviewed Past Medical History, Past Surgical History, Social History, and Family History.    Objective:      Vitals:      Physical Exam  Constitutional:       General: She  is not in acute distress.     Appearance: She is well-developed. She is not diaphoretic.   HENT:      Head: Normocephalic and atraumatic.   Eyes:      General:         Right eye: No discharge.         Left eye: No discharge.   Pulmonary:      Effort: Pulmonary effort is normal. No respiratory distress.   Skin:     Coloration: Skin is not pale.      Findings: No erythema.   Neurological:      Mental Status: She is alert.   Psychiatric:         Behavior: Behavior normal.           Lab Results   Component Value Date    WBC 8.70 11/11/2021    HGB 15.2 11/11/2021    HCT 46.5 11/11/2021     11/11/2021    ALT 5 (L) 11/11/2021    AST 14 11/11/2021     11/11/2021    K 4.0 11/11/2021     11/11/2021    CREATININE 0.9 11/11/2021    BUN 8 11/11/2021    CO2 22 (L) 11/11/2021    TSH 2.229 11/11/2021    HGBA1C 4.7 01/15/2021       The ASCVD Risk score (Terry DC Jr., et al., 2013) failed to calculate for the following reasons:    The 2013 ASCVD risk score is only valid for ages 40 to 79    (Imaging have been independently reviewed)  Thyroid U/S to eval for LAD c diffuse heterogenous thyroid gland, but no nodules, LAD.    Assessment:       1. Stress          Plan:       Sherrie was seen today for stress.    Diagnoses and all orders for this visit:    Stress  Comments:  Has sx of mild BRYANNA, dysthymia. Cont therapy. Discussed tx options. Start Wellbutrin 125 for concentration; discussed SE. RTC 1 mo for titration.   Orders:  -     buPROPion (WELLBUTRIN XL) 150 MG TB24 tablet; Take 1 tablet (150 mg total) by mouth once daily.    Other orders  The following orders have not been finalized:  -     Cancel: sertraline (ZOLOFT) 25 MG tablet         Side effects of medication(s) were discussed in detail and patient voiced understanding.  Patient will call back for any issues or complications.     RTC in 1 months or sooner PRN for mood. In-person or virtual, but 30 minutes long.

## 2022-05-10 ENCOUNTER — PATIENT MESSAGE (OUTPATIENT)
Dept: INTERNAL MEDICINE | Facility: CLINIC | Age: 32
End: 2022-05-10
Payer: COMMERCIAL

## 2022-05-10 ENCOUNTER — TELEPHONE (OUTPATIENT)
Dept: INTERNAL MEDICINE | Facility: CLINIC | Age: 32
End: 2022-05-10

## 2022-05-10 DIAGNOSIS — F43.9 STRESS: Primary | ICD-10-CM

## 2022-05-10 NOTE — TELEPHONE ENCOUNTER
Please don't make another encounter to pend orders.  You can pend them in the original encounter.

## 2022-05-18 ENCOUNTER — OFFICE VISIT (OUTPATIENT)
Dept: INTERNAL MEDICINE | Facility: CLINIC | Age: 32
End: 2022-05-18
Payer: COMMERCIAL

## 2022-05-18 DIAGNOSIS — F34.1 DYSTHYMIA: ICD-10-CM

## 2022-05-18 DIAGNOSIS — F43.9 STRESS: Primary | ICD-10-CM

## 2022-05-18 PROCEDURE — 99215 PR OFFICE/OUTPT VISIT, EST, LEVL V, 40-54 MIN: ICD-10-PCS | Mod: 95,,, | Performed by: INTERNAL MEDICINE

## 2022-05-18 PROCEDURE — 1160F PR REVIEW ALL MEDS BY PRESCRIBER/CLIN PHARMACIST DOCUMENTED: ICD-10-PCS | Mod: CPTII,95,, | Performed by: INTERNAL MEDICINE

## 2022-05-18 PROCEDURE — 1159F MED LIST DOCD IN RCRD: CPT | Mod: CPTII,95,, | Performed by: INTERNAL MEDICINE

## 2022-05-18 PROCEDURE — 1159F PR MEDICATION LIST DOCUMENTED IN MEDICAL RECORD: ICD-10-PCS | Mod: CPTII,95,, | Performed by: INTERNAL MEDICINE

## 2022-05-18 PROCEDURE — 99215 OFFICE O/P EST HI 40 MIN: CPT | Mod: 95,,, | Performed by: INTERNAL MEDICINE

## 2022-05-18 PROCEDURE — 1160F RVW MEDS BY RX/DR IN RCRD: CPT | Mod: CPTII,95,, | Performed by: INTERNAL MEDICINE

## 2022-05-18 RX ORDER — BUPROPION HYDROCHLORIDE 150 MG/1
150 TABLET ORAL DAILY
Qty: 60 TABLET | Refills: 3 | Status: SHIPPED | OUTPATIENT
Start: 2022-05-18 | End: 2023-05-18

## 2022-05-18 NOTE — PROGRESS NOTES
"The patient location is: [place]  The chief complaint leading to consultation is: Stress (F/u; no longer taking wellbutrin )    Visit type: Virtual visit with synchronous audio and video  Contact time spent with patient: 23 minutes  Each patient to whom he or she provides medical services by telemedicine is:  (1) informed of the relationship between the physician and patient and the respective role of any other health care provider with respect to management of the patient; and (2) notified that he or she may decline to receive medical services by telemedicine and may withdraw from such care at any time.    Subjective:       Patient ID: Sherrie Bravo is a 32 y.o. female who  has a past medical history of Allergy, Allergy to animals (7/14/2020), Asthma, Family history of melanoma (7/14/2020), Hand fracture, right, closed, initial encounter (1/1/2020), and Malaria (2010).    Chief Complaint: Stress (F/u; no longer taking wellbutrin )     History was obtained from the patient and supplemented through chart review  There were no ER or clinic visits since our last appointment.    Works here in [employer].  Has family in [place].     HPI    Stress:  Denies depressed mood, anxiety.  Immense stress, pressure at work. Has contributed to weight loss.  Increased stress d/t increased work load; [place] joined the [employer] system. Feels distracted when working from home. Sx as below occurs nearly daily.  Is amenable to starting a med for mood, but is concerned that meds will slow her down at work.  No strong association with her menstrual cycle.     Started talking to an OSH therapist 10/2021, which helped with anxiety.   Unsure of meds that her family has been on in the past.    Previous meds:  Started Zoloft 25, but felt like it decreased motivation, "didn't care". Maybe some emotional blunting. Increased fatigue, but also difficulty staying asleep.  Took it for a full month without improvement.      Started " wellbutrin 125 to help with concentration, mood. Felt like her old self with return of motivation, araceli compared to Zoloft. Felt a little more focused. No change in appetite. No insomnia when taking it qAM. Didn't notice a difference after taking it for a month, so she stopped for a while. No side effects.    Sleep:  6 hours at night. Usually no more than 7 hours. No issues. Wakes up at 3 AM thinking of work.  Anhedonia:  Ok. Difficult d/t the pandemic.  Guilt: feels guilty about not finishing her work  Energy: decreased  Concentration: decreased.  Work is half completed. Hard to finish tasks.  Distracted araceli when working at home.  Appetite: ok   Psychomotor agitation: fidgety and sometimes dragging  SI: no            Not addressed today.  Unintentional weight loss:  See HPI 03/23/2022.  Mental health, work place stress as above.  No diarrhea, change in stools. Normal appetite but has early satiety (usually does not have time for a full meal and eats small meals since she is on the go).  No dysphagia, odynophagia. No GERD sx. Has nausea after eating.    Birth control: Condoms, IUD. Regular menstrual cycles.      No infectious symptoms.  Night sweats, hot flashes resolved.  She has had recurrent skin infections/staph.  CXR without acute abnormality.  Not on any offending medications.    UTD on Pap smear  s e/o malignancy.  Repeating co testing in 5 years.  FHx Mom with melanoma, MGM uterine cancer, PGF colon cancer.    +hair loss, thinning. Cold intolerance. Occasional palpitations.   Unclear etiology. CBC, CMP, TSH, CXR WNL. HIV NR. TTG neg.  Quantiferon gold neg. H pylori neg. Rec GI eval as below.  Lab Results   Component Value Date    TSH 2.229 11/11/2021     BMI Readings from Last 3 Encounters:   02/11/22 24.41 kg/m²   02/10/22 24.76 kg/m²   12/02/21 21.76 kg/m²     Night sweats:  Resolved. W/u as above. HIV, CRP, UA, TSH neg. BCx x 2 NG. QuantiFERON gold neg.     Early satiety:  H pylori neg. Recommend GI  eval for EGD.  UPT negative.    LAD:  Thyroid U/S to eval for LAD c diffuse heterogenous thyroid gland, but no nodules, LAD. TSH wnl.    AR, mild intermittent asthma:    Exposure to pets. Weed pollen. Saw allergy.  Recommended antihistamine p.r.n.    Used to occur more often she's active, but chronic mild intermittent cough, SOB, wheezing, chest tightness. worsened x 1.5 years. Occurs daily.  Started around the time of her recurrent skin infx.  Is not very bothersome, so rarely uses her inhaler.  Currently no rhinorrhea, postnasal drip, itchy watery eyes. No nocturnal awakenings.  No GERD sx.  CBC w/o eosinophils.  Chronic, persistent. No AR, GERD sx. Will reassess, consider PFTs, low dose ICS/LABA.    H/o cyanocobalamin deficiency:  >10 years ago, had SOB, weight loss, arrhythmia, illness.  Had extreme B deficiency and was on daily B shots.  No change in diet.  Pescartarian.      No anemia. Normal MCV. Iron, B12, folate all normal.  Ferritin borderline low.  TIBC WNL.  Okay to start iron if she prefers.  Lab Results   Component Value Date    IRON 104 06/15/2021    TIBC 374 06/15/2021    FERRITIN 23 06/15/2021     Lab Results   Component Value Date    IQBRKEFO76 336 06/15/2021     Lab Results   Component Value Date    FOLATE 12.7 06/15/2021     Guttate psoriasis:  Family history of melanoma.  Following with [facility].  Confirmed on biopsy.  On topical steroids.    H/o Recurrent infections:  H/o buttock abscess  status post drainage.  Completed Bactrim, Keflex for Staph aureus.  Has Doxy. Dermatology recommended bathing with Hibiclens.     Follows with Derm, Infectious Disease for scalp nodules, ?cellulitis.  Treated with doxycycline, but would recur.  Not consistent with abscess, but inflammatory.  Thought to have secondary infection.  Biopsy c/w lichen simplex chronicus/ prurigo nodularis. No longer with scalp issues. Follows with Derm.    Family hx of Vitiligo:  +Dad   Lab Results   Component  Value Date    TSH 2.229 11/11/2021     Lab Results   Component Value Date/Time    HGBA1C 4.7 01/15/2021 10:33 AM     Review of Systems   Constitutional: Negative for activity change and unexpected weight change.   HENT: Negative for hearing loss, rhinorrhea and trouble swallowing.    Eyes: Negative for discharge and visual disturbance.   Respiratory: Positive for wheezing. Negative for chest tightness.    Cardiovascular: Negative for chest pain and palpitations.   Gastrointestinal: Negative for abdominal pain, blood in stool, constipation, diarrhea and vomiting.   Endocrine: Negative for polydipsia and polyuria.   Genitourinary: Negative for difficulty urinating, dysuria, hematuria and menstrual problem.   Musculoskeletal: Negative for arthralgias, joint swelling and neck pain.   Neurological: Negative for weakness and headaches.   Psychiatric/Behavioral: Negative for confusion, dysphoric mood and sleep disturbance.       I personally reviewed Past Medical History, Past Surgical History, Social History, and Family History.    Objective:      Vitals:      Physical Exam  Constitutional:       General: She is not in acute distress.     Appearance: She is well-developed. She is not diaphoretic.   HENT:      Head: Normocephalic and atraumatic.   Eyes:      General:         Right eye: No discharge.         Left eye: No discharge.   Pulmonary:      Effort: Pulmonary effort is normal. No respiratory distress.   Skin:     Coloration: Skin is not pale.      Findings: No erythema.   Neurological:      Mental Status: She is alert.   Psychiatric:         Behavior: Behavior normal.           Lab Results   Component Value Date    WBC 8.70 11/11/2021    HGB 15.2 11/11/2021    HCT 46.5 11/11/2021     11/11/2021    ALT 5 (L) 11/11/2021    AST 14 11/11/2021     11/11/2021    K 4.0 11/11/2021     11/11/2021    CREATININE 0.9 11/11/2021    BUN 8 11/11/2021    CO2 22 (L) 11/11/2021    TSH 2.229 11/11/2021    HGBA1C 4.7  01/15/2021       The ASCVD Risk score (Islesfordeloise CARLOS Jr., et al., 2013) failed to calculate for the following reasons:    The 2013 ASCVD risk score is only valid for ages 40 to 79    (Imaging have been independently reviewed)  Thyroid U/S to eval for LAD c diffuse heterogenous thyroid gland, but no nodules, LAD.    Assessment:       1. Stress    2. Dysthymia          Plan:       Sherrie was seen today for stress.    Diagnoses and all orders for this visit:    Stress  Comments:  Persistent. Discussed tx options. Given concentration/focus, restart Wellbutrin 125 (increase to 300 after 1 wk); discussed SE.  Orders:  -     buPROPion (WELLBUTRIN XL) 150 MG TB24 tablet; Take 1 tablet (150 mg total) by mouth once daily. After 1 week, increase to 300mg once a day.    Dysthymia  Comments:  Has sx of mild BRYANNA, dysthymia. Plan as above. Cont therapy.   Orders:  -     buPROPion (WELLBUTRIN XL) 150 MG TB24 tablet; Take 1 tablet (150 mg total) by mouth once daily. After 1 week, increase to 300mg once a day.         Side effects of medication(s) were discussed in detail and patient voiced understanding.  Patient will call back for any issues or complications.     I have spent a total of 40 minutes with the patient as well as reviewing the chart/medical record and placing orders on the day of the visit. Discussed mental health, stress.     RTC in 4-6 weeks or sooner PRN for mood. In-person or virtual.

## 2022-05-20 ENCOUNTER — TELEPHONE (OUTPATIENT)
Dept: INTERNAL MEDICINE | Facility: CLINIC | Age: 32
End: 2022-05-20

## 2022-05-20 NOTE — TELEPHONE ENCOUNTER
MD ANDREW Trejo Staff  1. Please schedule a virtual visit in 4-6 weeks for mental health. Thanks!

## 2022-11-06 DIAGNOSIS — L02.421 FURUNCLE OF RIGHT AXILLA: ICD-10-CM

## 2022-11-07 ENCOUNTER — OFFICE VISIT (OUTPATIENT)
Dept: DERMATOLOGY | Facility: CLINIC | Age: 32
End: 2022-11-07
Payer: COMMERCIAL

## 2022-11-07 ENCOUNTER — PATIENT MESSAGE (OUTPATIENT)
Dept: DERMATOLOGY | Facility: CLINIC | Age: 32
End: 2022-11-07

## 2022-11-07 DIAGNOSIS — L03.221 CELLULITIS AND ABSCESS OF NECK: ICD-10-CM

## 2022-11-07 DIAGNOSIS — L02.11 CELLULITIS AND ABSCESS OF NECK: ICD-10-CM

## 2022-11-07 DIAGNOSIS — Z22.322 MRSA CARRIER: Primary | ICD-10-CM

## 2022-11-07 PROCEDURE — 10060 I&D ABSCESS SIMPLE/SINGLE: CPT | Mod: S$GLB,,, | Performed by: DERMATOLOGY

## 2022-11-07 PROCEDURE — 1159F MED LIST DOCD IN RCRD: CPT | Mod: CPTII,S$GLB,, | Performed by: DERMATOLOGY

## 2022-11-07 PROCEDURE — 87070 CULTURE OTHR SPECIMN AEROBIC: CPT | Performed by: DERMATOLOGY

## 2022-11-07 PROCEDURE — 10060 PR DRAIN SKIN ABSCESS SIMPLE: ICD-10-PCS | Mod: S$GLB,,, | Performed by: DERMATOLOGY

## 2022-11-07 PROCEDURE — 87186 SC STD MICRODIL/AGAR DIL: CPT | Performed by: DERMATOLOGY

## 2022-11-07 PROCEDURE — 1160F RVW MEDS BY RX/DR IN RCRD: CPT | Mod: CPTII,S$GLB,, | Performed by: DERMATOLOGY

## 2022-11-07 PROCEDURE — 99499 NO LOS: ICD-10-PCS | Mod: S$GLB,,, | Performed by: DERMATOLOGY

## 2022-11-07 PROCEDURE — 1160F PR REVIEW ALL MEDS BY PRESCRIBER/CLIN PHARMACIST DOCUMENTED: ICD-10-PCS | Mod: CPTII,S$GLB,, | Performed by: DERMATOLOGY

## 2022-11-07 PROCEDURE — 1159F PR MEDICATION LIST DOCUMENTED IN MEDICAL RECORD: ICD-10-PCS | Mod: CPTII,S$GLB,, | Performed by: DERMATOLOGY

## 2022-11-07 PROCEDURE — 99499 UNLISTED E&M SERVICE: CPT | Mod: S$GLB,,, | Performed by: DERMATOLOGY

## 2022-11-07 PROCEDURE — 87077 CULTURE AEROBIC IDENTIFY: CPT | Performed by: DERMATOLOGY

## 2022-11-07 RX ORDER — MUPIROCIN 20 MG/G
OINTMENT TOPICAL
Qty: 22 G | Refills: 1 | Status: SHIPPED | OUTPATIENT
Start: 2022-11-07

## 2022-11-07 RX ORDER — SULFAMETHOXAZOLE AND TRIMETHOPRIM 800; 160 MG/1; MG/1
1 TABLET ORAL 2 TIMES DAILY
Qty: 20 TABLET | Refills: 0 | Status: SHIPPED | OUTPATIENT
Start: 2022-11-07 | End: 2022-11-10 | Stop reason: SDUPTHER

## 2022-11-07 RX ORDER — DOXYCYCLINE 100 MG/1
TABLET ORAL
Qty: 20 TABLET | Refills: 0 | OUTPATIENT
Start: 2022-11-07

## 2022-11-07 NOTE — PROGRESS NOTES
Patient Information  Name: Sherrie Bravo  : 1990  MRN: [id number]     Referring Physician:  No ref. provider found   Primary Care Physician:  uCca Nash MD   Date of Visit: 2022      Subjective:     History of Present lllness:    Sherrie Bravo is a 32 y.o. female who presents with a chief complaint of abscess on neck.  Location: upper chest  Duration: 4 days  Signs/Symptoms: inflamed bump under skin, tender, enlarging  Relieving factors/Prior treatments: warm wet compresses and antiseptic spray    Patient was last seen: 10/8/2021.  Prior notes by myself reviewed.   Clinical documentation obtained by nursing staff reviewed.    Review of Systems   Constitutional:  Negative for fever and chills.     Objective:   Physical Exam   Constitutional: She appears well-developed and well-nourished. No distress.   Neurological: She is alert and oriented to person, place, and time. She is not disoriented.   Psychiatric: She has a normal mood and affect.   Skin:   Areas Examined (abnormalities noted in diagram):   Neck Inspection Performed  Chest / Axilla Inspection Performed          Diagram Legend     Erythematous scaling macule/papule c/w actinic keratosis       Vascular papule c/w angioma      Pigmented verrucoid papule/plaque c/w seborrheic keratosis      Yellow umbilicated papule c/w sebaceous hyperplasia      Irregularly shaped tan macule c/w lentigo     1-2 mm smooth white papules consistent with Milia      Movable subcutaneous cyst with punctum c/w epidermal inclusion cyst      Subcutaneous movable cyst c/w pilar cyst      Firm pink to brown papule c/w dermatofibroma      Pedunculated fleshy papule(s) c/w skin tag(s)      Evenly pigmented macule c/w junctional nevus     Mildly variegated pigmented, slightly irregular-bordered macule c/w mildly atypical nevus      Flesh colored to evenly pigmented papule c/w intradermal nevus       Pink pearly papule/plaque c/w basal cell  carcinoma      Erythematous hyperkeratotic cursted plaque c/w SCC      Surgical scar with no sign of skin cancer recurrence      Open and closed comedones      Inflammatory papules and pustules      Verrucoid papule consistent consistent with wart     Erythematous eczematous patches and plaques     Dystrophic onycholytic nail with subungual debris c/w onychomycosis     Umbilicated papule    Erythematous-base heme-crusted tan verrucoid plaque consistent with inflamed seborrheic keratosis     Erythematous Silvery Scaling Plaque c/w Psoriasis     See annotation    No images are attached to the encounter or orders placed in the encounter.      [] Data reviewed  [] Prior external notes reviewed  [] Independent review of test  [] Management discussed with another provider  [] Independent historian    Assessment / Plan:        Cellulitis and abscess of neck  MRSA carrier  Incision and drainage procedure note:  The site was cleaned with alcohol and anesthetized with 1% lidocaine with epinephrine. The site was incised with #15 blade, and minimal purulent material was drained along with blunt dissection of any loculated adhesions. Bacterial culture was performed on purulent drainage. The wound was dressed with sterile gauze and paper tape. The patient tolerated the procedure well without complications.  Standard post-procedure care is explained and return precautions are given.    -     Aerobic culture  Bacterial culture was performed to rule out Staph aureus vs other pathogenic bacteria.   Will notify patient of culture results and any change in treatment plan once the results have returned.    -     sulfamethoxazole-trimethoprim 800-160mg (BACTRIM DS) 800-160 mg Tab; Take 1 tablet by mouth 2 (two) times daily. for 10 days  Dispense: 20 tablet; Refill: 0  Take 2 tabs PO BID for the first 2-3 days.  -     mupirocin (BACTROBAN) 2 % ointment; Apply to affected areas of body TID x 1-2 weeks.  Dispense: 22 g; Refill: 1    Pt was  instructed to go to ER if worsening or not improving with above treatment.    Follow up if symptoms worsen or fail to improve.      Leslee Ramirez MD, FAAD  [facility]

## 2022-11-09 ENCOUNTER — PATIENT MESSAGE (OUTPATIENT)
Dept: DERMATOLOGY | Facility: CLINIC | Age: 32
End: 2022-11-09
Payer: COMMERCIAL

## 2022-11-09 DIAGNOSIS — L03.221 CELLULITIS AND ABSCESS OF NECK: Primary | ICD-10-CM

## 2022-11-09 DIAGNOSIS — L02.11 CELLULITIS AND ABSCESS OF NECK: Primary | ICD-10-CM

## 2022-11-10 LAB — BACTERIA SPEC AEROBE CULT: ABNORMAL

## 2022-11-10 RX ORDER — SULFAMETHOXAZOLE AND TRIMETHOPRIM 800; 160 MG/1; MG/1
1 TABLET ORAL 2 TIMES DAILY
Qty: 14 TABLET | Refills: 0 | Status: SHIPPED | OUTPATIENT
Start: 2022-11-10 | End: 2022-11-15 | Stop reason: SDUPTHER

## 2022-11-10 NOTE — TELEPHONE ENCOUNTER
Spoke with pt. Pain is improved even though she still has swelling. Preliminary culture growing S. aureus but sensitivities have not resulted yet. Will continue Bactrim for now. If not improving within the next day or two, she will go to ER for further treatment.

## 2022-11-15 ENCOUNTER — OFFICE VISIT (OUTPATIENT)
Dept: URGENT CARE | Facility: CLINIC | Age: 32
End: 2022-11-15
Payer: COMMERCIAL

## 2022-11-15 VITALS
BODY MASS INDEX: 24.35 KG/M2 | HEIGHT: 61 IN | RESPIRATION RATE: 16 BRPM | TEMPERATURE: 99 F | SYSTOLIC BLOOD PRESSURE: 130 MMHG | DIASTOLIC BLOOD PRESSURE: 85 MMHG | OXYGEN SATURATION: 98 % | WEIGHT: 129 LBS | HEART RATE: 84 BPM

## 2022-11-15 DIAGNOSIS — L03.221 CELLULITIS AND ABSCESS OF NECK: Primary | ICD-10-CM

## 2022-11-15 DIAGNOSIS — L02.11 CELLULITIS AND ABSCESS OF NECK: Primary | ICD-10-CM

## 2022-11-15 PROCEDURE — 3079F DIAST BP 80-89 MM HG: CPT | Mod: CPTII,S$GLB,,

## 2022-11-15 PROCEDURE — 1159F PR MEDICATION LIST DOCUMENTED IN MEDICAL RECORD: ICD-10-PCS | Mod: CPTII,S$GLB,,

## 2022-11-15 PROCEDURE — 3008F BODY MASS INDEX DOCD: CPT | Mod: CPTII,S$GLB,,

## 2022-11-15 PROCEDURE — 3079F PR MOST RECENT DIASTOLIC BLOOD PRESSURE 80-89 MM HG: ICD-10-PCS | Mod: CPTII,S$GLB,,

## 2022-11-15 PROCEDURE — 99213 OFFICE O/P EST LOW 20 MIN: CPT | Mod: S$GLB,,,

## 2022-11-15 PROCEDURE — 1160F PR REVIEW ALL MEDS BY PRESCRIBER/CLIN PHARMACIST DOCUMENTED: ICD-10-PCS | Mod: CPTII,S$GLB,,

## 2022-11-15 PROCEDURE — 3075F PR MOST RECENT SYSTOLIC BLOOD PRESS GE 130-139MM HG: ICD-10-PCS | Mod: CPTII,S$GLB,,

## 2022-11-15 PROCEDURE — 1160F RVW MEDS BY RX/DR IN RCRD: CPT | Mod: CPTII,S$GLB,,

## 2022-11-15 PROCEDURE — 3075F SYST BP GE 130 - 139MM HG: CPT | Mod: CPTII,S$GLB,,

## 2022-11-15 PROCEDURE — 99213 PR OFFICE/OUTPT VISIT, EST, LEVL III, 20-29 MIN: ICD-10-PCS | Mod: S$GLB,,,

## 2022-11-15 PROCEDURE — 3008F PR BODY MASS INDEX (BMI) DOCUMENTED: ICD-10-PCS | Mod: CPTII,S$GLB,,

## 2022-11-15 PROCEDURE — 1159F MED LIST DOCD IN RCRD: CPT | Mod: CPTII,S$GLB,,

## 2022-11-15 RX ORDER — SULFAMETHOXAZOLE AND TRIMETHOPRIM 800; 160 MG/1; MG/1
1 TABLET ORAL 2 TIMES DAILY
Qty: 14 TABLET | Refills: 0 | Status: SHIPPED | OUTPATIENT
Start: 2022-11-15 | End: 2022-11-22

## 2022-11-15 NOTE — PATIENT INSTRUCTIONS
Please return here or go to the Emergency Department for any concerns or worsening of condition.  If you were prescribed antibiotics, please take them to completion.  Please take antibiotics with food and a full glass of water.  Please follow up with your primary care doctor or specialist as needed.    If you  smoke, please stop smoking.

## 2022-11-15 NOTE — PROGRESS NOTES
"Subjective:       Patient ID: Sherrie Bravo is a 32 y.o. female.    Vitals:  height is 5' 1" (1.549 m) and weight is 58.5 kg (129 lb). Her temperature is 98.6 °F (37 °C). Her blood pressure is 130/85 and her pulse is 84. Her respiration is 16 and oxygen saturation is 98%.     Chief Complaint: Recurrent Skin Infections    32-year-old female presents to the urgent Care with a history of cellulitis and abscess of the neck.  Patient states that she saw her dermatologist 11/07/2022 and was given Bactrim for abscess.  Patient states that her abscess has better but would like to check up on its condition and that she is running out of oral antibiotics.  Patient states she has been putting hydrogen peroxide on the affected area and taking her antibiotics. Pain level 2.  Patient states taking ibuprofen for pain with mild relief and mupirocin used 2-3 times a day with mild relief.  Patient denies chest, shortness of breath, fever, and chills.     Rash  The current episode started 1 to 4 weeks ago. The problem has been gradually improving since onset. The rash is characterized by redness, pain and itchiness. It is unknown if there was an exposure to a precipitant. Pertinent negatives include no congestion, eye pain, facial edema, fever, shortness of breath or sore throat. Past treatments include antibiotic cream and antibiotics. The treatment provided moderate relief.   Constitution: Negative for appetite change, chills and fever.   HENT:  Negative for ear pain, congestion, sinus pain, sinus pressure, sore throat, trouble swallowing and voice change.    Neck: Positive for neck pain. Negative for neck stiffness, painful lymph nodes and neck swelling.   Cardiovascular:  Negative for chest pain and sob on exertion.   Eyes:  Negative for eye discharge, eye itching, eye pain and eye redness.   Respiratory:  Negative for chest tightness, shortness of breath, stridor and wheezing.    Gastrointestinal:  Negative for " abdominal pain.   Genitourinary:  Negative for flank pain.   Musculoskeletal:  Negative for muscle cramps and muscle ache.   Skin:  Positive for rash, erythema and abscess. Negative for pale, wound, abrasion, laceration, lesion, skin thickening/induration, puncture wound, bruising and avulsion.   Neurological:  Negative for dizziness, numbness and tremors.   Hematologic/Lymphatic: Negative for swollen lymph nodes.     Objective:      Physical Exam   Constitutional: She is oriented to person, place, and time.  Non-toxic appearance. She does not appear ill. No distress. normal  HENT:   Head: Normocephalic and atraumatic.   Ears:   Right Ear: External ear normal.   Left Ear: External ear normal.   Nose: Nose normal.   Eyes: Conjunctivae are normal. Right eye exhibits no discharge. Left eye exhibits no discharge. No scleral icterus. Extraocular movement intact   Neck: Neck supple. No neck rigidity present.   Cardiovascular: Normal rate and regular rhythm.   Pulmonary/Chest: Effort normal and breath sounds normal.   Abdominal: She exhibits no distension. There is no guarding.   Musculoskeletal: Normal range of motion.         General: No deformity. Normal range of motion.      Cervical back: She exhibits tenderness.   Lymphadenopathy:     She has no cervical adenopathy.   Neurological: She is alert and oriented to person, place, and time.   Skin: Skin is warm, not diaphoretic, not pale, no rash, not urticarial, not pustular, not purpuric, not macular, not vesicular, not papular, not maculopapular and abscessed. Capillary refill takes less than 2 seconds. erythema        Psychiatric: Her behavior is normal. Mood, judgment and thought content normal.   Nursing note and vitals reviewed.      Results for orders placed or performed in visit on 11/07/22   Aerobic culture    Specimen: Neck; Skin   Result Value Ref Range    Aerobic Bacterial Culture (A)      METHICILLIN RESISTANT STAPHYLOCOCCUS AUREUS  Many          Susceptibility    Methicillin resistant Staphylococcus aureus - CULTURE, AEROBIC  (SPECIFY SOURCE)     Clindamycin >4 Resistant mcg/mL     Erythromycin >4 Resistant mcg/mL     Oxacillin >2 Resistant mcg/mL     Penicillin >8 Resistant mcg/mL     Trimeth/Sulfa <=0.5/9.5 Sensitive mcg/mL     Tetracycline <=4 Sensitive mcg/mL     Vancomycin 2 Sensitive mcg/mL       Assessment:       1. Cellulitis and abscess of neck          Plan:     Previous external notes reviewed.  Vital signs reviewed.  Labs reviewed from dermatologist.   Discussed cellulitis and abscess of the neck, home care, tx options, and given follow up precautions  Patient involved with the treatment plan and elected to agreed to the plan.  Patient informed on warning signs, patient understood warning signs.  Patient informed to return to the urgent care or go to the ER if warning signs appear.  Patients Dermatologist took a culture of the affected area.  Culture MRSA+, patient will be given a continuation of her Bactrim.   Patient encouraged to discontinue using Hydrogen peroxide.  Patient encouraged to continue taking the oral abx, applying topical abx, and clean the area with saline water and sterile gauze.   Patient given saline flush and gauze in the clinic.     Patient Instructions   Please return here or go to the Emergency Department for any concerns or worsening of condition.  If you were prescribed antibiotics, please take them to completion.  Please take antibiotics with food and a full glass of water.  Please follow up with your primary care doctor or specialist as needed.    If you  smoke, please stop smoking.     Cellulitis and abscess of neck  -     sulfamethoxazole-trimethoprim 800-160mg (BACTRIM DS) 800-160 mg Tab; Take 1 tablet by mouth 2 (two) times daily. for 7 days  Dispense: 14 tablet; Refill: 0      Mckay Olmos PA-C

## 2023-05-09 ENCOUNTER — PATIENT MESSAGE (OUTPATIENT)
Dept: DERMATOLOGY | Facility: CLINIC | Age: 33
End: 2023-05-09
Payer: COMMERCIAL